# Patient Record
Sex: MALE | Race: OTHER | NOT HISPANIC OR LATINO | URBAN - METROPOLITAN AREA
[De-identification: names, ages, dates, MRNs, and addresses within clinical notes are randomized per-mention and may not be internally consistent; named-entity substitution may affect disease eponyms.]

---

## 2020-11-07 ENCOUNTER — OUTPATIENT (OUTPATIENT)
Dept: OUTPATIENT SERVICES | Facility: HOSPITAL | Age: 75
LOS: 1 days | Discharge: HOME | End: 2020-11-07

## 2020-11-07 ENCOUNTER — APPOINTMENT (OUTPATIENT)
Dept: OTOLARYNGOLOGY | Facility: CLINIC | Age: 75
End: 2020-11-07
Payer: MEDICAID

## 2020-11-07 VITALS
SYSTOLIC BLOOD PRESSURE: 123 MMHG | TEMPERATURE: 98.3 F | BODY MASS INDEX: 24.6 KG/M2 | HEIGHT: 66 IN | HEART RATE: 104 BPM | WEIGHT: 153.05 LBS | DIASTOLIC BLOOD PRESSURE: 68 MMHG

## 2020-11-07 DIAGNOSIS — H90.42 SENSORINEURAL HEARING LOSS, UNILATERAL, LEFT EAR, WITH UNRESTRICTED HEARING ON THE CONTRALATERAL SIDE: ICD-10-CM

## 2020-11-07 DIAGNOSIS — D23.22 OTHER BENIGN NEOPLASM OF SKIN OF LEFT EAR AND EXTERNAL AURICULAR CANAL: ICD-10-CM

## 2020-11-07 DIAGNOSIS — H93.292 OTHER ABNORMAL AUDITORY PERCEPTIONS, LEFT EAR: ICD-10-CM

## 2020-11-07 PROCEDURE — 99204 OFFICE O/P NEW MOD 45 MIN: CPT

## 2020-11-07 PROCEDURE — 99072 ADDL SUPL MATRL&STAF TM PHE: CPT

## 2020-11-07 NOTE — REVIEW OF SYSTEMS
[Hearing Loss] : hearing loss [Dizziness] : dizziness [Vertigo] : vertigo [Cough] : cough [Joint Pain] : joint pain [Negative] : Heme/Lymph

## 2020-11-09 NOTE — HISTORY OF PRESENT ILLNESS
[de-identified] : 75M with left hearing loss beginning in September 2020.  Hearing loss is constant, nonfluctuating, profound severity.  Occasional dizziness.

## 2020-11-09 NOTE — CONSULT LETTER
[FreeTextEntry2] : Juice Pereira MD [FreeTextEntry1] : Dear Juice,\par \par Thanks for referring Ari Edge for evaluation of his left profound hearing loss.  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  I have the report of his MRI and plan to obtain the images for review as well.  Today in the office I discussed the pathophysiology, natural history, and management strategies for acoustic neuroma with him and his family members.  Because of the tumor's small size and his older age, I recommended initial observation and repeat imaging in 4 months.  They are in agreement with this plan.\par \par Thank you once again for the opportunity to participate in your patient's care, and I will keep you informed as to his progress.\par \par Best regards,\par \par Ady Ugarte MD\par Otology/Neurotology\par [facility]\par Health system\par

## 2020-11-09 NOTE — DATA REVIEWED
[de-identified] : I personally reviewed the patient's audiogram, which shows left profound hearing loss.\par  [de-identified] : MRI report shows small 4-5 mm left vestibular schwannoma.\par  [de-identified] : I personally reviewed outside records and they are summarized as follows:  he underwent evaluation by Dr. Pereira and was found to have a left vestibular schwannoma.\par

## 2020-11-09 NOTE — REASON FOR VISIT
[Initial Consultation] : an initial consultation for [Family Member] : family member [FreeTextEntry2] : brain MRI shows lesions on left internal auditory canal. Also patient has left ear hearing loss.

## 2020-12-11 DIAGNOSIS — H93.292 OTHER ABNORMAL AUDITORY PERCEPTIONS, LEFT EAR: ICD-10-CM

## 2020-12-11 DIAGNOSIS — D23.22 OTHER BENIGN NEOPLASM OF SKIN OF LEFT EAR AND EXTERNAL AURICULAR CANAL: ICD-10-CM

## 2020-12-11 DIAGNOSIS — H90.42 SENSORINEURAL HEARING LOSS, UNILATERAL, LEFT EAR, WITH UNRESTRICTED HEARING ON THE CONTRALATERAL SIDE: ICD-10-CM

## 2021-02-06 ENCOUNTER — APPOINTMENT (OUTPATIENT)
Dept: MRI IMAGING | Facility: IMAGING CENTER | Age: 76
End: 2021-02-06

## 2021-07-16 ENCOUNTER — APPOINTMENT (OUTPATIENT)
Dept: CT IMAGING | Facility: CLINIC | Age: 76
End: 2021-07-16

## 2021-08-03 ENCOUNTER — OUTPATIENT (OUTPATIENT)
Dept: OUTPATIENT SERVICES | Facility: HOSPITAL | Age: 76
LOS: 1 days | End: 2021-08-03
Payer: MEDICAID

## 2021-08-03 ENCOUNTER — APPOINTMENT (OUTPATIENT)
Dept: CT IMAGING | Facility: CLINIC | Age: 76
End: 2021-08-03
Payer: MEDICAID

## 2021-08-03 DIAGNOSIS — R06.00 DYSPNEA, UNSPECIFIED: ICD-10-CM

## 2021-08-03 PROCEDURE — 82565 ASSAY OF CREATININE: CPT

## 2021-08-03 PROCEDURE — 75574 CT ANGIO HRT W/3D IMAGE: CPT | Mod: 26

## 2021-08-03 PROCEDURE — 75574 CT ANGIO HRT W/3D IMAGE: CPT

## 2021-12-27 ENCOUNTER — APPOINTMENT (OUTPATIENT)
Dept: DISASTER EMERGENCY | Facility: CLINIC | Age: 76
End: 2021-12-27

## 2021-12-27 DIAGNOSIS — Z01.818 ENCOUNTER FOR OTHER PREPROCEDURAL EXAMINATION: ICD-10-CM

## 2021-12-27 LAB — SARS-COV-2 N GENE NPH QL NAA+PROBE: NOT DETECTED

## 2021-12-28 ENCOUNTER — NON-APPOINTMENT (OUTPATIENT)
Age: 76
End: 2021-12-28

## 2021-12-29 ENCOUNTER — APPOINTMENT (OUTPATIENT)
Dept: PULMONOLOGY | Facility: CLINIC | Age: 76
End: 2021-12-29
Payer: MEDICAID

## 2021-12-29 ENCOUNTER — RX CHANGE (OUTPATIENT)
Age: 76
End: 2021-12-29

## 2021-12-29 VITALS
BODY MASS INDEX: 26.66 KG/M2 | TEMPERATURE: 97.3 F | HEART RATE: 84 BPM | SYSTOLIC BLOOD PRESSURE: 132 MMHG | DIASTOLIC BLOOD PRESSURE: 82 MMHG | HEIGHT: 65 IN | WEIGHT: 160 LBS

## 2021-12-29 DIAGNOSIS — J45.909 UNSPECIFIED ASTHMA, UNCOMPLICATED: ICD-10-CM

## 2021-12-29 PROCEDURE — 99204 OFFICE O/P NEW MOD 45 MIN: CPT | Mod: 25

## 2021-12-29 PROCEDURE — 94010 BREATHING CAPACITY TEST: CPT

## 2021-12-29 PROCEDURE — ZZZZZ: CPT

## 2022-01-01 ENCOUNTER — RX CHANGE (OUTPATIENT)
Age: 77
End: 2022-01-01

## 2022-01-01 LAB
ANA PAT FLD IF-IMP: ABNORMAL
ANA SER IF-ACNC: ABNORMAL
CCP AB SER IA-ACNC: <8 UNITS
CENTROMERE IGG SER-ACNC: <0.2 CD:130001892
CK SERPL-CCNC: 103 U/L
CRP SERPL HS-MCNC: 1.2 MG/L
DEPRECATED KAPPA LC FREE/LAMBDA SER: 1.04 RATIO
DSDNA AB SER-ACNC: 157 IU/ML
ENA JO1 AB SER IA-ACNC: <0.2 AL
ENA RNP AB SER IA-ACNC: <0.2 AL
ENA SCL70 IGG SER IA-ACNC: <0.2 AL
ENA SM AB SER IA-ACNC: <0.2 AL
ENA SS-A AB SER IA-ACNC: <0.2 AL
ENA SS-B AB SER IA-ACNC: <0.2 AL
ERYTHROCYTE [SEDIMENTATION RATE] IN BLOOD BY WESTERGREN METHOD: 62 MM/HR
HIV1+2 AB SPEC QL IA.RAPID: NONREACTIVE
IGA SER QL IEP: 338 MG/DL
IGG SER QL IEP: 1786 MG/DL
IGM SER QL IEP: 65 MG/DL
KAPPA LC CSF-MCNC: 2.6 MG/DL
KAPPA LC SERPL-MCNC: 2.7 MG/DL
M TB IFN-G BLD-IMP: NEGATIVE
QUANTIFERON TB PLUS MITOGEN MINUS NIL: 4.94 IU/ML
QUANTIFERON TB PLUS NIL: 0.03 IU/ML
QUANTIFERON TB PLUS TB1 MINUS NIL: -0.01 IU/ML
QUANTIFERON TB PLUS TB2 MINUS NIL: 0.02 IU/ML
RF+CCP IGG SER-IMP: NEGATIVE
RHEUMATOID FACT SER QL: <10 IU/ML
RIBOSOMAL P AB SER IA-ACNC: <0.2 AL

## 2022-01-01 NOTE — PHYSICAL EXAM
[No Acute Distress] : no acute distress [Normal Appearance] : normal appearance [No Resp Distress] : no resp distress [Benign] : benign [No Edema] : no edema [Normal Rate/Rhythm] : normal rate/rhythm [Normal S1, S2] : normal s1, s2 [TextBox_80] : Limited due to cough, basilar crackles - otherwise clear.  [TextBox_105] : Digital clubbing [TextBox_132] : Gait unsteadiness

## 2022-01-01 NOTE — REVIEW OF SYSTEMS
[Cough] : cough [SOB on Exertion] : sob on exertion [Negative] : Endocrine [TextBox_119] : gait imbalance

## 2022-01-01 NOTE — END OF VISIT
[] : Fellow [Time Spent: ___ minutes] : I have spent [unfilled] minutes of time on the encounter. [>50% of the face to face encounter time was spent on counseling and/or coordination of care for ___] : Greater than 50% of the face to face encounter time was spent on counseling and/or coordination of care for [unfilled] [FreeTextEntry3] : \par Newly diagnosed ILD of unclear etiology with symptoms of dyspnea and non-productive cough, especially with speaking. Recent CT coronaries with bilateral groundglass opacities, septal thickening, and peripheral honeycombing consistent with ILD. He has noticeable clubbing. Has scattered arthralgias and reports dry mouth and dry eyes. He also reports coughing with eating. Spirometry today suggestive of moderate restriction, but FET was only 1.43 seconds, likely underestimating the true FVC.\par \par Differential includes IPF vs. ILD 2/2 CTD vs. chronic aspiration pneumonitis. Labs today with elevated VINCENT and dsDNA along with elevated ESR. Remainder of workup negative, including RF, CCP, CPK, CRP, scleroderma, ribosomal P protein, Rosa-1, centromere, ADRI, Sjogren's, Quantiferon, and HIV. Check dedicated CT chest without contrast. Complete PFTs, including lung volumes and diffusing capacity. Start pantoprazole. Referral to speech and swallow, will likely require FEES. Start trial of prednisone with slow taper. If has clinical improvement, would refer to rheumatology and consider steroid-sparing agent, such as mycophenolate.

## 2022-01-01 NOTE — HISTORY OF PRESENT ILLNESS
[Never] : never [TextBox_4] :  [id number]\par \par 75 yo M PMH L vestibular schwannona/hearing loss and arthritis presenting for follow up of abnormal CT. Had CT coronaries performed on 8/2021 for the evaluation of chest pain and shortness of breath and found to have coronary calcium score of 0 though w/ e/o bilateral ground glass, septal thickening, and peripheral predominant honeycombing. States that since 2018 had symptoms of cough and shortness of breath. Was evaluated by outside pulmonary and treated with inhalers without significant improvement. Symptoms have been progressing throughout the years though with significant improvement since September. Denies prior hospitalizations for breathing. Cough and shortness of breath are daily - nonproductive though with associated chest pain. Unable to state triggers for SOB/cough - though per daughter in law worse w/ speaking. States that he took many medications (unable to name) without improvement. Denies fevers, chills, weight loss, or recent sick contacts. \par \par Denies prior history of lung disease or lung infections. No prior exposure to wood burning ovens. Notes bilateral knee pain, swelling as well as in digits of hands and wrist. Denies rashes. Occasional dry eyes and dry mouth. Has dentures. Also w/ gait unsteadiness. Reports difficulty swallowing/cough with eating. \par \par SHx\par Denies tobacco, alcohol, drug use\par Lives in house with wife, son, daughter in law, and grandchildren\par No pets, no smokers in house\par Currently unemployed since 2018, previously worked in construction\par Emigrated 2010 from [place]\par \par FHx - denies lung disease\par \par Allergies - Denies\par \par Meds: PRN pantoprazole

## 2022-01-01 NOTE — DISCUSSION/SUMMARY
[FreeTextEntry1] : 75 yo M PMH L vestibular schwannona/hearing loss and arthritis presenting for evaluation of abnormal CT suggestive of underlying ILD\par \par - will send off for aldolase, ACE, VINCENT, centromere, CRP, CK, CCP, dsDNA, RF, SS A/B, HIV, immunoglobulin, shelli-1, myomarker, quantiferon, ribosomal P protein, scl, ESR\par - ordered for non con CT of chest\par - repeat PFTs with volumes and spirometry\par - saturations of 94% on ambulation\par - will refer to speech and swallow for aspiration evaluation given cough w/ eating\par - recommend daily pantoprazole\par - will start prednisone 20mg tapered by 5mg every 10 days (15x10, 10x10, 5x10, off)\par - add benzonatate\par \par RTC in 2-3 weeks

## 2022-01-06 LAB
ACE BLD-CCNC: 53 U/L
ALDOLASE SERPL-CCNC: 4.8 U/L

## 2022-01-17 LAB
EJ AB SER QL: NEGATIVE
ENA JO1 AB SER IA-ACNC: <20 UNITS
ENA PM/SCL AB SER-ACNC: <20 UNITS
ENA SM+RNP AB SER IA-ACNC: <20 UNITS
ENA SS-A IGG SER QL: <20 UNITS
FIBRILLARIN AB SER QL: NEGATIVE
KU AB SER QL: NEGATIVE
MDA-5 (P140)(CADM-140): <20 UNITS
MI2 AB SER QL: NEGATIVE
NXP-2 (P140): <20 UNITS
OJ AB SER QL: NEGATIVE
PL12 AB SER QL: NEGATIVE
PL7 AB SER QL: NEGATIVE
SRP AB SERPL QL: NEGATIVE
TIF GAMMA (P155/140): <20 UNITS
U2 SNRNP AB SER QL: NEGATIVE

## 2022-01-21 ENCOUNTER — APPOINTMENT (OUTPATIENT)
Dept: SPEECH THERAPY | Facility: CLINIC | Age: 77
End: 2022-01-21
Payer: MEDICAID

## 2022-01-21 PROCEDURE — 92612 ENDOSCOPY SWALLOW (FEES) VID: CPT | Mod: GN

## 2022-10-05 ENCOUNTER — INPATIENT (INPATIENT)
Facility: HOSPITAL | Age: 77
LOS: 0 days | Discharge: HOME | DRG: 196 | End: 2022-10-06
Attending: STUDENT IN AN ORGANIZED HEALTH CARE EDUCATION/TRAINING PROGRAM | Admitting: STUDENT IN AN ORGANIZED HEALTH CARE EDUCATION/TRAINING PROGRAM
Payer: MEDICAID

## 2022-10-05 VITALS
SYSTOLIC BLOOD PRESSURE: 143 MMHG | HEART RATE: 114 BPM | OXYGEN SATURATION: 90 % | WEIGHT: 160.06 LBS | TEMPERATURE: 101 F | HEIGHT: 67 IN | DIASTOLIC BLOOD PRESSURE: 87 MMHG | RESPIRATION RATE: 20 BRPM

## 2022-10-05 DIAGNOSIS — R91.8 OTHER NONSPECIFIC ABNORMAL FINDING OF LUNG FIELD: ICD-10-CM

## 2022-10-05 DIAGNOSIS — Z29.9 ENCOUNTER FOR PROPHYLACTIC MEASURES, UNSPECIFIED: ICD-10-CM

## 2022-10-05 DIAGNOSIS — R73.9 HYPERGLYCEMIA, UNSPECIFIED: ICD-10-CM

## 2022-10-05 DIAGNOSIS — J18.9 PNEUMONIA, UNSPECIFIED ORGANISM: ICD-10-CM

## 2022-10-05 DIAGNOSIS — R25.1 TREMOR, UNSPECIFIED: ICD-10-CM

## 2022-10-05 LAB
ALBUMIN SERPL ELPH-MCNC: 4.1 G/DL — SIGNIFICANT CHANGE UP (ref 3.3–5)
ALP SERPL-CCNC: 80 U/L — SIGNIFICANT CHANGE UP (ref 40–120)
ALT FLD-CCNC: 9 U/L — LOW (ref 10–45)
ANION GAP SERPL CALC-SCNC: 14 MMOL/L — SIGNIFICANT CHANGE UP (ref 5–17)
APTT BLD: 28.9 SEC — SIGNIFICANT CHANGE UP (ref 27.5–35.5)
AST SERPL-CCNC: 19 U/L — SIGNIFICANT CHANGE UP (ref 10–40)
BASOPHILS # BLD AUTO: 0.04 K/UL — SIGNIFICANT CHANGE UP (ref 0–0.2)
BASOPHILS NFR BLD AUTO: 0.4 % — SIGNIFICANT CHANGE UP (ref 0–2)
BILIRUB SERPL-MCNC: 0.8 MG/DL — SIGNIFICANT CHANGE UP (ref 0.2–1.2)
BUN SERPL-MCNC: 16 MG/DL — SIGNIFICANT CHANGE UP (ref 7–23)
CALCIUM SERPL-MCNC: 9.2 MG/DL — SIGNIFICANT CHANGE UP (ref 8.4–10.5)
CHLORIDE SERPL-SCNC: 98 MMOL/L — SIGNIFICANT CHANGE UP (ref 96–108)
CO2 SERPL-SCNC: 21 MMOL/L — LOW (ref 22–31)
CREAT SERPL-MCNC: 1.08 MG/DL — SIGNIFICANT CHANGE UP (ref 0.5–1.3)
D DIMER BLD IA.RAPID-MCNC: 182 NG/ML DDU — SIGNIFICANT CHANGE UP
EGFR: 72 ML/MIN/1.73M2 — SIGNIFICANT CHANGE UP
EOSINOPHIL # BLD AUTO: 0.19 K/UL — SIGNIFICANT CHANGE UP (ref 0–0.5)
EOSINOPHIL NFR BLD AUTO: 1.9 % — SIGNIFICANT CHANGE UP (ref 0–6)
GAS PNL BLDV: SIGNIFICANT CHANGE UP
GLUCOSE SERPL-MCNC: 156 MG/DL — HIGH (ref 70–99)
HCT VFR BLD CALC: 45 % — SIGNIFICANT CHANGE UP (ref 39–50)
HGB BLD-MCNC: 14.5 G/DL — SIGNIFICANT CHANGE UP (ref 13–17)
IMM GRANULOCYTES NFR BLD AUTO: 0.2 % — SIGNIFICANT CHANGE UP (ref 0–0.9)
INR BLD: 1.17 RATIO — HIGH (ref 0.88–1.16)
LYMPHOCYTES # BLD AUTO: 3.27 K/UL — SIGNIFICANT CHANGE UP (ref 1–3.3)
LYMPHOCYTES # BLD AUTO: 33.5 % — SIGNIFICANT CHANGE UP (ref 13–44)
MCHC RBC-ENTMCNC: 28.8 PG — SIGNIFICANT CHANGE UP (ref 27–34)
MCHC RBC-ENTMCNC: 32.2 GM/DL — SIGNIFICANT CHANGE UP (ref 32–36)
MCV RBC AUTO: 89.5 FL — SIGNIFICANT CHANGE UP (ref 80–100)
MONOCYTES # BLD AUTO: 0.76 K/UL — SIGNIFICANT CHANGE UP (ref 0–0.9)
MONOCYTES NFR BLD AUTO: 7.8 % — SIGNIFICANT CHANGE UP (ref 2–14)
NEUTROPHILS # BLD AUTO: 5.47 K/UL — SIGNIFICANT CHANGE UP (ref 1.8–7.4)
NEUTROPHILS NFR BLD AUTO: 56.2 % — SIGNIFICANT CHANGE UP (ref 43–77)
NRBC # BLD: 0 /100 WBCS — SIGNIFICANT CHANGE UP (ref 0–0)
PLATELET # BLD AUTO: 206 K/UL — SIGNIFICANT CHANGE UP (ref 150–400)
POTASSIUM SERPL-MCNC: 3.8 MMOL/L — SIGNIFICANT CHANGE UP (ref 3.5–5.3)
POTASSIUM SERPL-SCNC: 3.8 MMOL/L — SIGNIFICANT CHANGE UP (ref 3.5–5.3)
PROT SERPL-MCNC: 8.2 G/DL — SIGNIFICANT CHANGE UP (ref 6–8.3)
PROTHROM AB SERPL-ACNC: 13.6 SEC — HIGH (ref 10.5–13.4)
RBC # BLD: 5.03 M/UL — SIGNIFICANT CHANGE UP (ref 4.2–5.8)
RBC # FLD: 14.2 % — SIGNIFICANT CHANGE UP (ref 10.3–14.5)
SODIUM SERPL-SCNC: 133 MMOL/L — LOW (ref 135–145)
WBC # BLD: 9.75 K/UL — SIGNIFICANT CHANGE UP (ref 3.8–10.5)
WBC # FLD AUTO: 9.75 K/UL — SIGNIFICANT CHANGE UP (ref 3.8–10.5)

## 2022-10-05 PROCEDURE — 99285 EMERGENCY DEPT VISIT HI MDM: CPT

## 2022-10-05 PROCEDURE — 93010 ELECTROCARDIOGRAM REPORT: CPT

## 2022-10-05 PROCEDURE — 71045 X-RAY EXAM CHEST 1 VIEW: CPT | Mod: 26

## 2022-10-05 PROCEDURE — 99223 1ST HOSP IP/OBS HIGH 75: CPT

## 2022-10-05 RX ORDER — AZITHROMYCIN 500 MG/1
500 TABLET, FILM COATED ORAL EVERY 24 HOURS
Refills: 0 | Status: DISCONTINUED | OUTPATIENT
Start: 2022-10-06 | End: 2022-10-06

## 2022-10-05 RX ORDER — SODIUM CHLORIDE 9 MG/ML
1000 INJECTION, SOLUTION INTRAVENOUS
Refills: 0 | Status: DISCONTINUED | OUTPATIENT
Start: 2022-10-05 | End: 2022-10-06

## 2022-10-05 RX ORDER — AZITHROMYCIN 500 MG/1
500 TABLET, FILM COATED ORAL ONCE
Refills: 0 | Status: COMPLETED | OUTPATIENT
Start: 2022-10-05 | End: 2022-10-05

## 2022-10-05 RX ORDER — CEFTRIAXONE 500 MG/1
1000 INJECTION, POWDER, FOR SOLUTION INTRAMUSCULAR; INTRAVENOUS ONCE
Refills: 0 | Status: COMPLETED | OUTPATIENT
Start: 2022-10-05 | End: 2022-10-05

## 2022-10-05 RX ORDER — DEXAMETHASONE 0.5 MG/5ML
6 ELIXIR ORAL ONCE
Refills: 0 | Status: COMPLETED | OUTPATIENT
Start: 2022-10-05 | End: 2022-10-05

## 2022-10-05 RX ORDER — DEXTROSE 50 % IN WATER 50 %
25 SYRINGE (ML) INTRAVENOUS ONCE
Refills: 0 | Status: DISCONTINUED | OUTPATIENT
Start: 2022-10-05 | End: 2022-10-06

## 2022-10-05 RX ORDER — DEXTROSE 50 % IN WATER 50 %
12.5 SYRINGE (ML) INTRAVENOUS ONCE
Refills: 0 | Status: DISCONTINUED | OUTPATIENT
Start: 2022-10-05 | End: 2022-10-06

## 2022-10-05 RX ORDER — GLUCAGON INJECTION, SOLUTION 0.5 MG/.1ML
1 INJECTION, SOLUTION SUBCUTANEOUS ONCE
Refills: 0 | Status: DISCONTINUED | OUTPATIENT
Start: 2022-10-05 | End: 2022-10-06

## 2022-10-05 RX ORDER — CEFTRIAXONE 500 MG/1
1000 INJECTION, POWDER, FOR SOLUTION INTRAMUSCULAR; INTRAVENOUS EVERY 24 HOURS
Refills: 0 | Status: DISCONTINUED | OUTPATIENT
Start: 2022-10-06 | End: 2022-10-06

## 2022-10-05 RX ORDER — ACETAMINOPHEN 500 MG
975 TABLET ORAL ONCE
Refills: 0 | Status: COMPLETED | OUTPATIENT
Start: 2022-10-05 | End: 2022-10-05

## 2022-10-05 RX ORDER — ACETAMINOPHEN 500 MG
650 TABLET ORAL EVERY 6 HOURS
Refills: 0 | Status: DISCONTINUED | OUTPATIENT
Start: 2022-10-05 | End: 2022-10-06

## 2022-10-05 RX ORDER — DEXTROSE 50 % IN WATER 50 %
15 SYRINGE (ML) INTRAVENOUS ONCE
Refills: 0 | Status: DISCONTINUED | OUTPATIENT
Start: 2022-10-05 | End: 2022-10-06

## 2022-10-05 RX ORDER — INSULIN LISPRO 100/ML
VIAL (ML) SUBCUTANEOUS AT BEDTIME
Refills: 0 | Status: DISCONTINUED | OUTPATIENT
Start: 2022-10-06 | End: 2022-10-06

## 2022-10-05 RX ORDER — ALBUTEROL 90 UG/1
2 AEROSOL, METERED ORAL EVERY 6 HOURS
Refills: 0 | Status: DISCONTINUED | OUTPATIENT
Start: 2022-10-05 | End: 2022-10-06

## 2022-10-05 RX ORDER — INSULIN LISPRO 100/ML
VIAL (ML) SUBCUTANEOUS
Refills: 0 | Status: DISCONTINUED | OUTPATIENT
Start: 2022-10-05 | End: 2022-10-06

## 2022-10-05 RX ADMIN — AZITHROMYCIN 255 MILLIGRAM(S): 500 TABLET, FILM COATED ORAL at 20:05

## 2022-10-05 RX ADMIN — Medication 975 MILLIGRAM(S): at 22:23

## 2022-10-05 RX ADMIN — ALBUTEROL 2 PUFF(S): 90 AEROSOL, METERED ORAL at 23:48

## 2022-10-05 RX ADMIN — CEFTRIAXONE 100 MILLIGRAM(S): 500 INJECTION, POWDER, FOR SOLUTION INTRAMUSCULAR; INTRAVENOUS at 19:32

## 2022-10-05 RX ADMIN — Medication 6 MILLIGRAM(S): at 19:27

## 2022-10-05 RX ADMIN — Medication 975 MILLIGRAM(S): at 19:32

## 2022-10-05 NOTE — ED PROVIDER NOTE - NS ED ROS FT
Constitutional: + fevers no chills.   CV: no chest pain, no palpitations   Respiratory: + shortness of breath, + cough   GI: no abdominal pain, no nausea no vomiting   Neuro: no headache, no weakness, no numbness

## 2022-10-05 NOTE — ED ADULT NURSE NOTE - PRO INTERPRETER NEED 2
Medication:   Requested Prescriptions     Pending Prescriptions Disp Refills    buPROPion (WELLBUTRIN XL) 150 MG extended release tablet [Pharmacy Med Name: buPROPion HCL  MG TABLET] 30 tablet      Sig: TAKE ONE TABLET BY MOUTH EVERY MORNING     Last Filled:  06/16/21    Last appt: 1/14/2022   Next appt: 4/5/2022    Last OARRS:   RX Monitoring 1/14/2022   Attestation -   Periodic Controlled Substance Monitoring Possible medication side effects, risk of tolerance/dependence & alternative treatments discussed. ;No signs of potential drug abuse or diversion identified. ;Assessed functional status.
Yifan

## 2022-10-05 NOTE — ED PROVIDER NOTE - PHYSICAL EXAMINATION
Const: appearing stated age, no acute distress  Head: atraumatic, normocephalic  Eyes: no conjunctival injection and no scleral icterus  ENMT: Atraumatic external nose and ears, Moist mucus membranes  Neck: Symmetric, trachea midline, no c spine tenderness  BACK: no bruising,   CVS: +S1/S2, dorsalis pedis/radial pulse 2+ bilaterally  RESP: bibasilar crackles, pt w/ tachypnea   GI: Nontender/Nondistended, soft abdomen  MSK: Extremities w/o deformity or ttp   Neuro: GCS=15,motor in all 4 extremities equal, Sensation grossly intact   Psych: Awake, Alert, & Orientedx3;  Appropriate mood and affect, cooperative

## 2022-10-05 NOTE — H&P ADULT - NSHPADDITIONALINFOADULT_GEN_ALL_CORE
NIGHT HOSPITALIST:     Patient/ son in attendance aware of course and agree with  plan/care as above.  Given patient's comorbidities, patient's long term prognosis is guarded.   Emotional support provided to patient/son.   Care reviewed with covering NP/PA for endorsement to my physician colleagues in the AM.    Jorge A Field MD  Available on [employer] Teams. NIGHT HOSPITALIST:     Patient/ son in attendance aware of course and agree with  plan/care as above.  Given patient's comorbidities, patient's long term prognosis is guarded.   Emotional support provided to patient/son.   Care reviewed with covering NP/PAMaria Teresa for endorsement to my physician colleagues in the AM.    Jorge A Field MD  Available on [employer] Teams.

## 2022-10-05 NOTE — ED ADULT NURSE NOTE - NSIMPLEMENTINTERV_GEN_ALL_ED
Implemented All Fall Risk Interventions:  [place] to call system. Call bell, personal items and telephone within reach. Instruct patient to call for assistance. Room bathroom lighting operational. Non-slip footwear when patient is off stretcher. Physically safe environment: no spills, clutter or unnecessary equipment. Stretcher in lowest position, wheels locked, appropriate side rails in place. Provide visual cue, wrist band, yellow gown, etc. Monitor gait and stability. Monitor for mental status changes and reorient to person, place, and time. Review medications for side effects contributing to fall risk. Reinforce activity limits and safety measures with patient and family.

## 2022-10-05 NOTE — H&P ADULT - REASON FOR ADMISSION
Dyspnoea for the past 6 months, worsening past 2 weeks, with intermittent haemoptysis. Dyspnoea for the past 6 months, worsening past 2 weeks, with intermittent hemoptysis.

## 2022-10-05 NOTE — ED PROVIDER NOTE - CLINICAL SUMMARY MEDICAL DECISION MAKING FREE TEXT BOX
77 M w/ hx of dementia, lives at home w/ son born in [place] here w/ cough for 10 days w/ worsening SOB, and fevers. pt w/ bibasilar crackles at the bases hypoxic, no features to suggest DVT on exam, w/ no lower leg edema, plan for labs imaging and admission will cover for pna, vs steroids low risk for PE< will send ddimer if greater than 500, will obtain PE scan.

## 2022-10-05 NOTE — H&P ADULT - HISTORY OF PRESENT ILLNESS
NIGHT HOSPITALIST:    Patient UNKNOWN to me previously, assigned to me at this point via the ER to admit this 78 y/o M--[facility]  # [id number] utilized--bilingual adult son in attendance--patient with apparent irregular followup with a physician (unclear to name) locally at [place], [place]--son reports patient is on no medications other than Robitussin--patient is from [place] but has been stateside for 12 years with no travel since (last in the [place] prior), reports COVID-19 vaccinated x 3, with no tobacco primary or secondary history, with son referring patient following apparently symptoms of dyspnoea for the past 4-6 months, with worsening symptoms for the past 2 weeks, with intermittent scant haemoptysis but no clots.  NO fever, no chills, no rigors.   Patient notes anorexia for several days but unclear to weight loss.   No reported night sweats.  No chest pain/pressure.  NO palpitations.   NO abdominal pain, no red blood per rectum or melena.  NO diaphoresis. NIGHT HOSPITALIST:    Patient UNKNOWN to me previously, assigned to me at this point via the ER to admit this 76 y/o M--[facility]  # [id number] utilized--bilingual adult son in attendance--patient with apparent irregular followup with a physician (unclear to name) locally at [place], [place]--son reports patient is on no medications other than Robitussin--patient is from [place] but has been stateside for 12 years with no travel since (last in the [place] prior), reports COVID-19 vaccinated x 3, with no tobacco primary or secondary history, with son referring patient following apparently symptoms of dyspnoea for the past 4-6 months, with worsening symptoms for the past 2 weeks, with intermittent scant haemoptysis but no clots.  NO fever, no chills, no rigors.   Patient notes anorexia for several days but unclear to weight loss.   No known pigeon interaction or exposure.   No reported night sweats.  No chest pain/pressure.  NO palpitations.   NO abdominal pain, no red blood per rectum or melena.  NO diaphoresis.

## 2022-10-05 NOTE — ED ADULT NURSE NOTE - OBJECTIVE STATEMENT
76 yo male with a PMH of interstitial lung disease, asthma, arthralgia presents to the ED with son at bedside translating complaining of cough. Son states he has been coughing for the past couple of months worsening over the past couple of weeks. Son reports that over the past couple of weeks patient was telling him that patient has been coughing up blood. Has seen PCP initially and had a CXR that was negative for any abnormal findings. Son is bringing patient in now due to hemoptysis. Patient denies any international travel, night sweats. Reports unintentional weight loss. Endorsing L sided non-radiating chest pain. Patient is tachycardic, tachypneic--more exacerbated with movement. Placed on 2L NC for comfort. Denies taking any antipyretics PTA. Denies headache, dizziness, vision changes,  abdominal pain, nausea, vomiting, diarrhea, chills, dysuria, hematuria, recent illness travel or fall.

## 2022-10-05 NOTE — H&P ADULT - MENTAL STATUS
AXOx3.   Speech fluent in native language.   Limited cognitive assessment with patient reliant upon son in attendance for interview, limited assessment with [facility] .

## 2022-10-05 NOTE — ED PROVIDER NOTE - OBJECTIVE STATEMENT
77 M w/ hx of dementia, lives at home w/ son born in [place] unknonw if vaccinated for TB or had TB in childhood here w/ cough for 10 days w/ worsening SOB, and fevers, pt follows w/ Dr. Patten son at bedside translating. pt has been dx w/ multiple condictions but refuses to take home medications. Pt here w/ hemoptysis for 5 days reports that it is intermittent.

## 2022-10-05 NOTE — H&P ADULT - NSHPREVIEWOFSYSTEMS_GEN_ALL_CORE
NO HA< no focal weakness, notes 3 years of unsteadiness.   Pill rolling tremor.  NO chest pain/pressure.  No palpitations.  NO abdominal pain, no red blood per rectum or melena.  No back pain, no tearing back pain.  NO rash.    Patient notes chronic diffuse joint pains.  Anorexia.  Unclear to weight loss.  NO SI/HI>  No thyroid symptoms.  NO dysuria, no hematuria.

## 2022-10-05 NOTE — H&P ADULT - ASSESSMENT
NIGHT HOSPITALIST:    NIGHT HOSPITALIST:    Apparent protracted symptoms (several months) of dyspnoea with worsening last 2 weeks with intermittent hemoptysis, poor PO, with radiographic reticulonodular infiltrate , diffuse digital clubbing, catabolic state--unclear to the rationale for IV steroids given in the ER>>COVID-19 PCR sent.  Will continue with IV antibiotics for now for presumed pneumonia but will maintain on airborne precautions for now.   Sputum for AFB x 3.  Proventil MDI for now and on O2.  CTT chest no contrast ordered.    Would consider formal pulmonary evaluation in the AM.    Patient with nonfocal neurologic exam except for B/L pill rolling tremor (Parkinson's?)>>will obtain a CTT head no contrast.    Will check HgBA1C with hyperglycemia on presentation.    MICHAEL for now with patient's intermittent hemoptysis.

## 2022-10-05 NOTE — H&P ADULT - PROBLEM SELECTOR PLAN 1
See above with hemoptysis.   IV antibiotics as above for now.   CTT chest no contrast ordered.  Would consider formal pulmonary evaluation in the AM.   Will HOLD off on additional steroids for now.   AFB sputum x 3.

## 2022-10-05 NOTE — H&P ADULT - NSHPLABSRESULTS_GEN_ALL_CORE
EKG tracing personally interpreted by me with sinus 88 with 1AV, poor anterior R wave progression.    Chest radiograph personally interpreted by me with bilateral reticulonodular opacities.    WBC 9.7  56N    Hgb 14.5    Platelets of 206K    INR 1.1  D dimer 182    Alb 4.1    Random glucose of 156  Cr 1.0    COVID-19 PCR>>sent.

## 2022-10-06 ENCOUNTER — TRANSCRIPTION ENCOUNTER (OUTPATIENT)
Age: 77
End: 2022-10-06

## 2022-10-06 VITALS — RESPIRATION RATE: 20 BRPM | OXYGEN SATURATION: 94 %

## 2022-10-06 DIAGNOSIS — J96.01 ACUTE RESPIRATORY FAILURE WITH HYPOXIA: ICD-10-CM

## 2022-10-06 DIAGNOSIS — B34.1 ENTEROVIRUS INFECTION, UNSPECIFIED: ICD-10-CM

## 2022-10-06 DIAGNOSIS — J84.9 INTERSTITIAL PULMONARY DISEASE, UNSPECIFIED: ICD-10-CM

## 2022-10-06 DIAGNOSIS — R04.2 HEMOPTYSIS: ICD-10-CM

## 2022-10-06 LAB
A1C WITH ESTIMATED AVERAGE GLUCOSE RESULT: 6.3 % — HIGH (ref 4–5.6)
APPEARANCE UR: CLEAR — SIGNIFICANT CHANGE UP
BILIRUB UR-MCNC: NEGATIVE — SIGNIFICANT CHANGE UP
COLOR SPEC: SIGNIFICANT CHANGE UP
CULTURE RESULTS: NO GROWTH — SIGNIFICANT CHANGE UP
DIFF PNL FLD: NEGATIVE — SIGNIFICANT CHANGE UP
ESTIMATED AVERAGE GLUCOSE: 134 MG/DL — HIGH (ref 68–114)
GLUCOSE BLDC GLUCOMTR-MCNC: 116 MG/DL — HIGH (ref 70–99)
GLUCOSE BLDC GLUCOMTR-MCNC: 141 MG/DL — HIGH (ref 70–99)
GLUCOSE UR QL: NEGATIVE — SIGNIFICANT CHANGE UP
HCV AB S/CO SERPL IA: 0.15 S/CO — SIGNIFICANT CHANGE UP (ref 0–0.99)
HCV AB SERPL-IMP: SIGNIFICANT CHANGE UP
KETONES UR-MCNC: NEGATIVE — SIGNIFICANT CHANGE UP
LEUKOCYTE ESTERASE UR-ACNC: NEGATIVE — SIGNIFICANT CHANGE UP
NITRITE UR-MCNC: NEGATIVE — SIGNIFICANT CHANGE UP
PH UR: 5.5 — SIGNIFICANT CHANGE UP (ref 5–8)
PROT UR-MCNC: NEGATIVE — SIGNIFICANT CHANGE UP
RAPID RVP RESULT: DETECTED
RV+EV RNA SPEC QL NAA+PROBE: DETECTED
SARS-COV-2 RNA SPEC QL NAA+PROBE: SIGNIFICANT CHANGE UP
SP GR SPEC: 1.01 — LOW (ref 1.01–1.02)
SPECIMEN SOURCE: SIGNIFICANT CHANGE UP
UROBILINOGEN FLD QL: NEGATIVE — SIGNIFICANT CHANGE UP

## 2022-10-06 PROCEDURE — 85730 THROMBOPLASTIN TIME PARTIAL: CPT

## 2022-10-06 PROCEDURE — 82947 ASSAY GLUCOSE BLOOD QUANT: CPT

## 2022-10-06 PROCEDURE — 82962 GLUCOSE BLOOD TEST: CPT

## 2022-10-06 PROCEDURE — 85379 FIBRIN DEGRADATION QUANT: CPT

## 2022-10-06 PROCEDURE — 71250 CT THORAX DX C-: CPT

## 2022-10-06 PROCEDURE — 96375 TX/PRO/DX INJ NEW DRUG ADDON: CPT

## 2022-10-06 PROCEDURE — 82330 ASSAY OF CALCIUM: CPT

## 2022-10-06 PROCEDURE — 84132 ASSAY OF SERUM POTASSIUM: CPT

## 2022-10-06 PROCEDURE — 85025 COMPLETE CBC W/AUTO DIFF WBC: CPT

## 2022-10-06 PROCEDURE — 82803 BLOOD GASES ANY COMBINATION: CPT

## 2022-10-06 PROCEDURE — 83036 HEMOGLOBIN GLYCOSYLATED A1C: CPT

## 2022-10-06 PROCEDURE — 99239 HOSP IP/OBS DSCHRG MGMT >30: CPT

## 2022-10-06 PROCEDURE — 70450 CT HEAD/BRAIN W/O DYE: CPT | Mod: 26

## 2022-10-06 PROCEDURE — 85610 PROTHROMBIN TIME: CPT

## 2022-10-06 PROCEDURE — 71045 X-RAY EXAM CHEST 1 VIEW: CPT

## 2022-10-06 PROCEDURE — 87086 URINE CULTURE/COLONY COUNT: CPT

## 2022-10-06 PROCEDURE — 83605 ASSAY OF LACTIC ACID: CPT

## 2022-10-06 PROCEDURE — 70450 CT HEAD/BRAIN W/O DYE: CPT

## 2022-10-06 PROCEDURE — 81003 URINALYSIS AUTO W/O SCOPE: CPT

## 2022-10-06 PROCEDURE — 84295 ASSAY OF SERUM SODIUM: CPT

## 2022-10-06 PROCEDURE — 82435 ASSAY OF BLOOD CHLORIDE: CPT

## 2022-10-06 PROCEDURE — 80053 COMPREHEN METABOLIC PANEL: CPT

## 2022-10-06 PROCEDURE — 85018 HEMOGLOBIN: CPT

## 2022-10-06 PROCEDURE — 86803 HEPATITIS C AB TEST: CPT

## 2022-10-06 PROCEDURE — 87040 BLOOD CULTURE FOR BACTERIA: CPT

## 2022-10-06 PROCEDURE — 99285 EMERGENCY DEPT VISIT HI MDM: CPT | Mod: 25

## 2022-10-06 PROCEDURE — 0225U NFCT DS DNA&RNA 21 SARSCOV2: CPT

## 2022-10-06 PROCEDURE — 85014 HEMATOCRIT: CPT

## 2022-10-06 PROCEDURE — 71250 CT THORAX DX C-: CPT | Mod: 26

## 2022-10-06 PROCEDURE — 96374 THER/PROPH/DIAG INJ IV PUSH: CPT

## 2022-10-06 PROCEDURE — 94640 AIRWAY INHALATION TREATMENT: CPT

## 2022-10-06 PROCEDURE — 99222 1ST HOSP IP/OBS MODERATE 55: CPT | Mod: GC

## 2022-10-06 RX ORDER — ALBUTEROL 90 UG/1
2 AEROSOL, METERED ORAL
Qty: 2 | Refills: 0
Start: 2022-10-06 | End: 2022-11-04

## 2022-10-06 RX ADMIN — ALBUTEROL 2 PUFF(S): 90 AEROSOL, METERED ORAL at 12:46

## 2022-10-06 RX ADMIN — Medication 20 MILLIGRAM(S): at 16:19

## 2022-10-06 RX ADMIN — ALBUTEROL 2 PUFF(S): 90 AEROSOL, METERED ORAL at 06:25

## 2022-10-06 NOTE — PROGRESS NOTE ADULT - PROBLEM SELECTOR PLAN 4
a1c 6.3  -ctm Seen initially on CT coronaries in the past. Has outpatient pulmonologist. Not currently on medications. Current presentation c/f ILD exacerbation vs progression  -Pulm consulted for need for steroid, appreciate recs->20mg pred daily for 7 days  -VINCENT + 1:640 homogenous 12/29/2021, 1:80 2018  -dsDNA +, scleroderma centromere RNP Sm Sjogrens and shelli antibodies negative  -quant neg 12/2021

## 2022-10-06 NOTE — DISCHARGE NOTE PROVIDER - NSDCFUSCHEDAPPT_GEN_ALL_CORE_FT
Jhonny Harris  [facility]  OTOLARYNG  [facility]  Scheduled Appointment: 11/14/2022

## 2022-10-06 NOTE — PROGRESS NOTE ADULT - PROBLEM SELECTOR PLAN 1
Rhino/entero +. likely etiology of worsening of respiratory status.  -supportive care  -NC weaned to 1L, wean to RA  -hold abx Initially tachypnic and hypoxic requiring 2L NC. ISO viral URI on known ILD  -O2, wean as tolerated  -ILD tx as below  -Entero/rhinovirus tx as bllow

## 2022-10-06 NOTE — DISCHARGE NOTE PROVIDER - CARE PROVIDER_API CALL
Jose Rodriguez)  Critical Care Medicine; Internal Medicine; Pulmonary Disease  [address] [place] [address]  Phone: [phone]  Fax: [phone]  Follow Up Time: 2 weeks

## 2022-10-06 NOTE — PROGRESS NOTE ADULT - ASSESSMENT
76 y/o M with h/o ILD presenting with subacute on chronic progressive dyspnea on exertion and 1 week of small volume hemoptysis, found to acute hypoxemic respiratory failure  iso ILD flare iso URI.

## 2022-10-06 NOTE — PROGRESS NOTE ADULT - PROBLEM SELECTOR PLAN 3
Seen initially on CT coronaries in the past. Has outpatient pulmonologist. Not currently on medications. Current presentation c/f ILD exacerbation vs progression  -Pulm consulted for need for steroid, appreciate recs  -VINCENT + 1:640 homogenous 12/29/2021, 1:80 2018  -dsDNA +, scleroderma centromere RNP Sm Sjogrens and shelli antibodies negative  -quant neg 12/2021 Likely iso repetitive tusses. No further episodes. Low concern for Tb based on CT scan and known ILD.  -Monitor for further episodes  -tessalon pearls

## 2022-10-06 NOTE — DISCHARGE NOTE NURSING/CASE MANAGEMENT/SOCIAL WORK - PATIENT PORTAL LINK FT
You can access the FollowMyHealth Patient Portal offered by [facility] by registering at the following website: [email or web address]. By joining [facility] FollowMyHealth portal, you will also be able to view your health information using other applications (apps) compatible with our system.

## 2022-10-06 NOTE — CONSULT NOTE ADULT - ATTENDING COMMENTS
Patient seen and examined. Patient is a 77M with interstitial lung disease who presents with cough, shortness of breath, and blood streaked sputum. He has a CT chest which demonstrates chronic fibrosing ILD and is found to have a positive rhinovirus/enterovirus.    He has not had any further hemoptysis in the ED.    He was seen once previously in outpatient pulmonary evaluation and found to have a positive VINCENT, dsDNA, and a negative quantiferon gold. He has no reported history of tuberculosis, latent tuberculosis, or treatment for either.    1. Hemoptysis - presently resolved  - likely in setting of viral URI/pneumonia - supportive care  - steroids for potential ILD exacerbation    2. 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 Niranjan Proctor - [phone]  - Would give a short course of Prednisone 20mg PO daily - and monitor for response  - Will need followup outpatient testing including PFTs and 6MWT  - If not a candidate for immunosuppressive therapy then may benefit from antifibrotic therapy - this should be discussed as an outpatient. Patient was supposed to come for pulmonary followup but reportedly felt well after prior Prednisone and did not think he required further treatment per family    3. R/O TB - patient with a negative quant gold in 12/2021  - CT findings not consistent with acute pulmonary TB - there appear to be chronic interstitial changes similar to those noted on prior CT coronaries  - Lower suspicion for TB - and patient without fevers, nightsweats, or weight loss. Hemoptysis appears related to coughing in setting of ILD and viral URI    4. Acute Hypoxemic Respiratory Failure - please check ambulatory O2 saturation to determine home O2 requirements.    Discussed with patient and son at bedside. [place]  deferred. Patient's daughter-in-law also called by Dr. Mina earlier in the day to review case/findings/recommendations. All questions answered.

## 2022-10-06 NOTE — CONSULT NOTE ADULT - ASSESSMENT
77M with PMHx of HTN, HLD and lung fibrosis with poor pulmonary follow up, presents to the hospital with 1 week of fever and three days of hemoptysis, admitted for infectious work up.      77M with PMHx of HTN, HLD and lung fibrosis with poor pulmonary follow up, presents to the hospital with 1 week of fever and three days of hemoptysis, admitted for infectious work up.    #Cough and hemoptysis:  - likely in the setting of viral bronchitis secondary to entero/rhinovirus in the background of intersitial lung disease  - ILD pattern consistent with probably UIP pattern  - outpatient work up shows + VINCENT 1:1280 and +dsDNA increasing concern for CTD related interstitial lung disease  - patient with poor pulmonary follow up (last seen in Dec 2021) and no rheum follow up as of yet    Recommendations:  - low suspicion for bacterial PNA, can hold off on abx  - consider 20mg of prednisone for 7 days to treat for viral bronchitis  - patient saturating 93-94% on room air; please obtain ambulatory o2 to determine need for home oxygen  - can send off auto immune serologies: VINCENT, dsDNA, RF, CCP, ESR and CRP which can be followed up as an outpatient    Will contact pulmonary office for outpatient follow up. If ambulatory saturation > 88%, can be discharged home with prednisone 77M with PMHx of HTN, HLD and lung fibrosis with poor pulmonary follow up, presents to the hospital with 1 week of fever and three days of hemoptysis, admitted for infectious work up.    #Cough and hemoptysis:  - likely in the setting of viral bronchitis secondary to entero/rhinovirus in the background of intersitial lung disease  - ILD pattern consistent with probably UIP pattern  - outpatient work up shows + VINCENT 1:1280 and +dsDNA increasing concern for CTD related interstitial lung disease  - patient with poor pulmonary follow up (last seen in Dec 2021) and no rheum follow up as of yet    Recommendations:  - low suspicion for bacterial PNA, can hold off on abx  - consider 20mg of prednisone for 7 days to treat for viral bronchitis  - patient saturating 93-94% on room air; please obtain ambulatory o2 to determine need for home oxygen  - can send off auto immune serologies: VINCENT, dsDNA, RF, CCP, ESR and CRP which can be followed up as an outpatient  - low suspicion for TB as quant gold neg in Dec 2021 and parenchymal findings on CT not suggestive of TB    Will contact pulmonary office for outpatient follow up. If ambulatory saturation > 88%, can be discharged home with prednisone

## 2022-10-06 NOTE — DISCHARGE NOTE PROVIDER - HOSPITAL COURSE
78 y/o M with h/o ILD lost to follow up presenting with worsening BOWIE for 3-4 months with abrupt worsening over the past week with few episodes of hemoptysis  . Found to be entero/rhinovirus +. CT chest with signs repeat signs of ILD. Started on 2L NC. Pulmonary evaluated, most c/w ILD flare iso  URI. Low concern TB. Started on prednisone 20mg for 7 day course. O2 weaned to___________. Ambulatory O2 sat _______. Stable for d/c home    Exam  CONSTITUTIONAL: NAD, well-developed  EYES: PERRLA; conjunctiva and sclera clear  NECK: Supple, no palpable masses  RESPIRATORY: Normal respiratory effort; fine crackles at the bases  CARDIOVASCULAR: Regular rate and rhythm, normal S1 and S2, no murmur/rub/gallop; No lower extremity edema; Peripheral pulses are 2+ bilaterally  ABDOMEN: Nontender to palpation, normoactive bowel sounds  MUSCULOSKELETAL: no clubbing or cyanosis of digits; no joint swelling or tenderness to palpation  NEUROLOGY: CN 2-12 are intact and symmetric; no gross sensory deficits   SKIN: No rashes; no palpable lesions    Labs  entero/rhinovirus +  -VINCENT + 1:640 homogenous 12/29/2021, 1:80 2018  -dsDNA +, scleroderma centromere RNP Sm Sjogrens and shelli antibodies negative  -quant neg 12/2021.    Plan  f/u with pulmonary, to reach out to patient  f/u with rheum for repeat labs and eval (likely element of CTD)  Prednisone 20mg for 7 days 76 y/o M with h/o ILD lost to follow up presenting with worsening BOWIE for 3-4 months with abrupt worsening over the past week with few episodes of hemoptysis  . Found to be entero/rhinovirus +. CT chest with signs repeat signs of ILD. Started on 2L NC. Pulmonary evaluated, most c/w ILD flare iso  URI. Low concern TB. Started on prednisone 20mg for 7 day course. O2 was weaned off. Ambulatory O2 sat is 94% on room air. Stable for d/c home    Exam  CONSTITUTIONAL: NAD, well-developed  EYES: PERRLA; conjunctiva and sclera clear  NECK: Supple, no palpable masses  RESPIRATORY: Normal respiratory effort; fine crackles at the bases  CARDIOVASCULAR: Regular rate and rhythm, normal S1 and S2, no murmur/rub/gallop; No lower extremity edema; Peripheral pulses are 2+ bilaterally  ABDOMEN: Nontender to palpation, normoactive bowel sounds  MUSCULOSKELETAL: no clubbing or cyanosis of digits; no joint swelling or tenderness to palpation  NEUROLOGY: CN 2-12 are intact and symmetric; no gross sensory deficits   SKIN: No rashes; no palpable lesions    Labs  entero/rhinovirus +  -VINCENT + 1:640 homogenous 12/29/2021, 1:80 2018  -dsDNA +, scleroderma centromere RNP Sm Sjogrens and shelli antibodies negative  -quant neg 12/2021.    Plan  f/u with pulmonary, to reach out to patient  f/u with rheum for repeat labs and eval (likely element of CTD)  Prednisone 20mg for 7 days

## 2022-10-06 NOTE — PROGRESS NOTE ADULT - PROBLEM SELECTOR PLAN 2
Likely iso repetitive tusses. No further episodes. Low concern for Tb based on CT scan and known ILD.  -Monitor for further episodes  -tessalon pearls Rhino/entero +. likely etiology of worsening of respiratory status.  -supportive care  -NC weaned to 1L, wean to RA  -hold abx

## 2022-10-06 NOTE — PROGRESS NOTE ADULT - SUBJECTIVE AND OBJECTIVE BOX
[facility] Division of Hospital Medicine  Marco Antonio Verdugo  Pager (M-F, 8A-5P): [phone]  Other Times:  [phone]    Translation services offered, preferred translation via son bedside    SUBJECTIVE / OVERNIGHT EVENTS:  No events this am  Reports cough unchanged  Denies f/chills  No hemoptysis this am  +enterovirus/rhinovirus    ADDITIONAL REVIEW OF SYSTEMS:    MEDICATIONS  (STANDING):  ALBUTerol    90 MICROgram(s) HFA Inhaler 2 Puff(s) Inhalation every 6 hours  dextrose 5%. 1000 milliLiter(s) (50 mL/Hr) IV Continuous <Continuous>  dextrose 5%. 1000 milliLiter(s) (100 mL/Hr) IV Continuous <Continuous>  dextrose 50% Injectable 25 Gram(s) IV Push once  dextrose 50% Injectable 12.5 Gram(s) IV Push once  dextrose 50% Injectable 25 Gram(s) IV Push once  glucagon  Injectable 1 milliGRAM(s) IntraMuscular once  insulin lispro (ADMELOG) corrective regimen sliding scale   SubCutaneous three times a day before meals  insulin lispro (ADMELOG) corrective regimen sliding scale   SubCutaneous at bedtime    MEDICATIONS  (PRN):  acetaminophen    Suspension .. 650 milliGRAM(s) Oral every 6 hours PRN Temp greater or equal to 38C (100.4F), Mild Pain (1 - 3)  dextrose Oral Gel 15 Gram(s) Oral once PRN Blood Glucose LESS THAN 70 milliGRAM(s)/deciliter      I&O's Summary      PHYSICAL EXAM:  Vital Signs Last 24 Hrs  T(C): 36.7 (06 Oct 2022 11:05), Max: 38.1 (05 Oct 2022 18:07)  T(F): 98.1 (06 Oct 2022 11:05), Max: 100.6 (05 Oct 2022 18:07)  HR: 57 (06 Oct 2022 11:05) (53 - 114)  BP: 157/80 (06 Oct 2022 11:05) (122/79 - 157/80)  BP(mean): --  RR: 20 (06 Oct 2022 11:05) (20 - 27)  SpO2: 98% (06 Oct 2022 11:05) (90% - 99%)    Parameters below as of 06 Oct 2022 11:05  Patient On (Oxygen Delivery Method): nasal cannula  O2 Flow (L/min): 1    CONSTITUTIONAL: NAD, well-developed  EYES: PERRLA; conjunctiva and sclera clear  NECK: Supple, no palpable masses  RESPIRATORY: Normal respiratory effort; crackles at the bases  CARDIOVASCULAR: Regular rate and rhythm, normal S1 and S2, no murmur/rub/gallop; No lower extremity edema; Peripheral pulses are 2+ bilaterally  ABDOMEN: Nontender to palpation, normoactive bowel sounds  MUSCULOSKELETAL: no clubbing or cyanosis of digits; no joint swelling or tenderness to palpation  NEUROLOGY: CN 2-12 are intact and symmetric; no gross sensory deficits   SKIN: No rashes; no palpable lesions    LABS:                        14.5   9.75  )-----------( 206      ( 05 Oct 2022 19:12 )             45.0     10-05    133<L>  |  98  |  16  ----------------------------<  156<H>  3.8   |  21<L>  |  1.08    Ca    9.2      05 Oct 2022 19:12    TPro  8.2  /  Alb  4.1  /  TBili  0.8  /  DBili  x   /  AST  19  /  ALT  9<L>  /  AlkPhos  80  10-05    PT/INR - ( 05 Oct 2022 19:12 )   PT: 13.6 sec;   INR: 1.17 ratio         PTT - ( 05 Oct 2022 19:12 )  PTT:28.9 sec      Urinalysis Basic - ( 05 Oct 2022 23:40 )    Color: Light Yellow / Appearance: Clear / S.008 / pH: x  Gluc: x / Ketone: Negative  / Bili: Negative / Urobili: Negative   Blood: x / Protein: Negative / Nitrite: Negative   Leuk Esterase: Negative / RBC: x / WBC x   Sq Epi: x / Non Sq Epi: x / Bacteria: x

## 2022-10-06 NOTE — ED ADULT NURSE REASSESSMENT NOTE - NS ED NURSE REASSESS COMMENT FT1
Pt reporting abd pain 3/10, pRN medication given, sputum culture collected, family and pt updated on plan of care

## 2022-10-06 NOTE — DISCHARGE NOTE PROVIDER - NSDCMRMEDTOKEN_GEN_ALL_CORE_FT
Robitussin 100 mg/5 mL oral liquid:    albuterol 90 mcg/inh inhalation aerosol: 2 puff(s) inhaled every 6 hours  benzonatate 100 mg oral capsule: 1 cap(s) orally every 8 hours, As needed, Cough  predniSONE 20 mg oral tablet: 1 tab(s) orally once a day x 7 days

## 2022-10-06 NOTE — DISCHARGE NOTE PROVIDER - NSFOLLOWUPCLINICS_GEN_ALL_ED_FT
[facility] Rheumatology  Rheumatology  [address] [address]  Phone: [phone]  Fax:   Follow Up Time: 2 weeks

## 2022-10-06 NOTE — CONSULT NOTE ADULT - SUBJECTIVE AND OBJECTIVE BOX
77M with PMHx of HTN, HLD and lung fibrosis with poor pulmonary follow up, presents to the hospital with 1 week of fever and three days of hemoptysis. Patient reports ongoing cough for the past 4 years, and followed up with a pulmonologist last year without further follow up. He reports he is afraid he has TB because he recalls his friend in the 1970s in [place] with similar complains who diagnosed with TB.    He reports no known sick contacts at home (lives with son, daughter in law and three children). He denies any recent hospitalizations as well. No prior history of TB or known TB exposures. He emigrated from [place] in  and has not been out of the country in the last 2 years.     Patient followed with Dr. Cleveland in Dec of 2021, and was asked to follow up but has not. Patient does not use oxygen at home.    PMHx:  Hemoptysis      No significant past surgical history          FAMILY HISTORY:  No pertinent family history in first degree relatives        SOCIAL HISTORY:  Smoking: [ X] Never Smoked [ ] Former Smoker (__ packs x ___ years) [ ] Current Smoker  (__ packs x ___ years)  Substance Use: [ ] Never Used [ ] Used ____  EtOH Use:  Marital Status: [ ] Single [ ]  [ ]  [ ]   Sexual History:   Occupation:  Recent Travel:  Country of Birth:  Advance Directives:    Allergies    No Known Allergies    Intolerances        HOME MEDICATIONS:    REVIEW OF SYSTEMS:  Constitutional: [ ] negative [X ] fevers [ ] chills [ ] weight loss [ ] weight gain  HEENT: [ ] negative [ ] dry eyes [ ] eye irritation [ ] postnasal drip [ ] nasal congestion  CV: [X ] negative  [ ] chest pain [ ] orthopnea [ ] palpitations [ ] murmur  Resp: [ ] negative [ ] cough [ X] shortness of breath [ ] dyspnea [ ] wheezing [ ] sputum [ ] hemoptysis  GI: [ X] negative [ ] nausea [ ] vomiting [ ] diarrhea [ ] constipation [ ] abd pain [ ] dysphagia   : [X ] negative [ ] dysuria [ ] nocturia [ ] hematuria [ ] increased urinary frequency  Musculoskeletal: [ ] negative [ ] back pain [ ] myalgias [ ] arthralgias [ ] fracture  Skin: [ ] negative [ ] rash [ ] itch  Neurological: [ ] negative [ ] headache [ ] dizziness [ ] syncope [ ] weakness [ ] numbness  Psychiatric: [ ] negative [ ] anxiety [ ] depression  Endocrine: [ ] negative [ ] diabetes [ ] thyroid problem  Hematologic/Lymphatic: [ ] negative [ ] anemia [ ] bleeding problem  Allergic/Immunologic: [ ] negative [ ] itchy eyes [ ] nasal discharge [ ] hives [ ] angioedema  [ ] All other systems negative  [ ] Unable to assess ROS because ________    OBJECTIVE:  ICU Vital Signs Last 24 Hrs  T(C): 36.7 (06 Oct 2022 11:05), Max: 38.1 (05 Oct 2022 18:07)  T(F): 98.1 (06 Oct 2022 11:05), Max: 100.6 (05 Oct 2022 18:07)  HR: 57 (06 Oct 2022 11:05) (53 - 114)  BP: 157/80 (06 Oct 2022 11:05) (122/79 - 157/80)  BP(mean): --  ABP: --  ABP(mean): --  RR: 20 (06 Oct 2022 11:05) (20 - 27)  SpO2: 98% (06 Oct 2022 11:05) (90% - 99%)    O2 Parameters below as of 06 Oct 2022 11:05  Patient On (Oxygen Delivery Method): nasal cannula  O2 Flow (L/min): 1            CAPILLARY BLOOD GLUCOSE      POCT Blood Glucose.: 141 mg/dL (06 Oct 2022 08:49)      PHYSICAL EXAM:  General: well appearing, NAD, laying in bed, on NC  HEENT: no icterus  Neck: supple  Respiratory: bibasilar crackles  Cardiovascular: S1/s2, RRR  Abdomen:  soft, nontender, nondistended  Extremities: no LE edema; + clubbing on fingers and toes  Skin: no rashes  Neurological: AxOX3  Psychiatry: normal mood and affect    HOSPITAL MEDICATIONS:        dextrose 50% Injectable 25 Gram(s) IV Push once  dextrose 50% Injectable 12.5 Gram(s) IV Push once  dextrose 50% Injectable 25 Gram(s) IV Push once  dextrose Oral Gel 15 Gram(s) Oral once PRN  glucagon  Injectable 1 milliGRAM(s) IntraMuscular once  insulin lispro (ADMELOG) corrective regimen sliding scale   SubCutaneous three times a day before meals  insulin lispro (ADMELOG) corrective regimen sliding scale   SubCutaneous at bedtime    ALBUTerol    90 MICROgram(s) HFA Inhaler 2 Puff(s) Inhalation every 6 hours    acetaminophen    Suspension .. 650 milliGRAM(s) Oral every 6 hours PRN          dextrose 5%. 1000 milliLiter(s) IV Continuous <Continuous>  dextrose 5%. 1000 milliLiter(s) IV Continuous <Continuous>            LABS:                        14.5   9.75  )-----------( 206      ( 05 Oct 2022 19:12 )             45.0     Hgb Trend: 14.5<--  10-05    133<L>  |  98  |  16  ----------------------------<  156<H>  3.8   |  21<L>  |  1.08    Ca    9.2      05 Oct 2022 19:12    TPro  8.2  /  Alb  4.1  /  TBili  0.8  /  DBili  x   /  AST  19  /  ALT  9<L>  /  AlkPhos  80  10-05    Creatinine Trend: 1.08<--  PT/INR - ( 05 Oct 2022 19:12 )   PT: 13.6 sec;   INR: 1.17 ratio         PTT - ( 05 Oct 2022 19:12 )  PTT:28.9 sec  Urinalysis Basic - ( 05 Oct 2022 23:40 )    Color: Light Yellow / Appearance: Clear / S.008 / pH: x  Gluc: x / Ketone: Negative  / Bili: Negative / Urobili: Negative   Blood: x / Protein: Negative / Nitrite: Negative   Leuk Esterase: Negative / RBC: x / WBC x   Sq Epi: x / Non Sq Epi: x / Bacteria: x        Venous Blood Gas:  10-05 @ 18:39  7.35/54/32/30/54.7  VBG Lactate: 1.7      MICROBIOLOGY:     RADIOLOGY:  [ ] Reviewed and interpreted by me    PULMONARY FUNCTION TESTS:    EKG: 77M with PMHx of HTN, HLD and lung fibrosis with poor pulmonary follow up, presents to the hospital with 1 week of fever and three days of hemoptysis. Patient reports ongoing cough for the past 4 years, and followed up with a pulmonologist last year without further follow up. He reports he is afraid he has TB because he recalls his friend in the 1970s in [place] with similar complains who diagnosed with TB.    He reports no known sick contacts at home (lives with son, daughter in law and three children). He denies any recent hospitalizations as well. No prior history of TB or known TB exposures. He emigrated from [place] in  and has not been out of the country in the last 2 years.     Patient followed with Dr. Cleveland in Dec of 2021, and was asked to follow up but has not. Patient does not use oxygen at home.    PMHx:  Hemoptysis      No significant past surgical history          FAMILY HISTORY:  No pertinent family history in first degree relatives        SOCIAL HISTORY:  Smoking: [ X] Never Smoked [ ] Former Smoker (__ packs x ___ years) [ ] Current Smoker  (__ packs x ___ years)  Substance Use: [ ] Never Used [ ] Used ____  EtOH Use:  Marital Status: [ ] Single [ ]  [ ]  [ ]   Sexual History:   Occupation:  Recent Travel:  Country of Birth:  Advance Directives:    Allergies    No Known Allergies    Intolerances        HOME MEDICATIONS:    REVIEW OF SYSTEMS:  Constitutional: [ ] negative [X ] fevers [ ] chills [ ] weight loss [ ] weight gain  HEENT: [ ] negative [ ] dry eyes [ ] eye irritation [ ] postnasal drip [ ] nasal congestion  CV: [X ] negative  [ ] chest pain [ ] orthopnea [ ] palpitations [ ] murmur  Resp: [ ] negative [ ] cough [ X] shortness of breath [ ] dyspnea [ ] wheezing [ ] sputum [ ] hemoptysis  GI: [ X] negative [ ] nausea [ ] vomiting [ ] diarrhea [ ] constipation [ ] abd pain [ ] dysphagia   : [X ] negative [ ] dysuria [ ] nocturia [ ] hematuria [ ] increased urinary frequency  Musculoskeletal: [ ] negative [ ] back pain [ ] myalgias [ ] arthralgias [ ] fracture  Skin: [ ] negative [ ] rash [ ] itch  Neurological: [ ] negative [ ] headache [ ] dizziness [ ] syncope [ ] weakness [ ] numbness  Psychiatric: [ ] negative [ ] anxiety [ ] depression  Endocrine: [ ] negative [ ] diabetes [ ] thyroid problem  Hematologic/Lymphatic: [ ] negative [ ] anemia [ ] bleeding problem  Allergic/Immunologic: [ ] negative [ ] itchy eyes [ ] nasal discharge [ ] hives [ ] angioedema  [ ] All other systems negative  [ ] Unable to assess ROS because ________    OBJECTIVE:  ICU Vital Signs Last 24 Hrs  T(C): 36.7 (06 Oct 2022 11:05), Max: 38.1 (05 Oct 2022 18:07)  T(F): 98.1 (06 Oct 2022 11:05), Max: 100.6 (05 Oct 2022 18:07)  HR: 57 (06 Oct 2022 11:05) (53 - 114)  BP: 157/80 (06 Oct 2022 11:05) (122/79 - 157/80)  BP(mean): --  ABP: --  ABP(mean): --  RR: 20 (06 Oct 2022 11:05) (20 - 27)  SpO2: 98% (06 Oct 2022 11:05) (90% - 99%)    O2 Parameters below as of 06 Oct 2022 11:05  Patient On (Oxygen Delivery Method): nasal cannula  O2 Flow (L/min): 1            CAPILLARY BLOOD GLUCOSE      POCT Blood Glucose.: 141 mg/dL (06 Oct 2022 08:49)      PHYSICAL EXAM:  General: well appearing, NAD, laying in bed, on NC  HEENT: no icterus  Neck: supple  Respiratory: bibasilar crackles  Cardiovascular: S1/s2, RRR  Abdomen:  soft, nontender, nondistended  Extremities: no LE edema; + clubbing on fingers and toes  Skin: no rashes  Neurological: AxOX3  Psychiatry: normal mood and affect    HOSPITAL MEDICATIONS:        dextrose 50% Injectable 25 Gram(s) IV Push once  dextrose 50% Injectable 12.5 Gram(s) IV Push once  dextrose 50% Injectable 25 Gram(s) IV Push once  dextrose Oral Gel 15 Gram(s) Oral once PRN  glucagon  Injectable 1 milliGRAM(s) IntraMuscular once  insulin lispro (ADMELOG) corrective regimen sliding scale   SubCutaneous three times a day before meals  insulin lispro (ADMELOG) corrective regimen sliding scale   SubCutaneous at bedtime    ALBUTerol    90 MICROgram(s) HFA Inhaler 2 Puff(s) Inhalation every 6 hours    acetaminophen    Suspension .. 650 milliGRAM(s) Oral every 6 hours PRN          dextrose 5%. 1000 milliLiter(s) IV Continuous <Continuous>  dextrose 5%. 1000 milliLiter(s) IV Continuous <Continuous>            LABS:                        14.5   9.75  )-----------( 206      ( 05 Oct 2022 19:12 )             45.0     Hgb Trend: 14.5<--  10-05    133<L>  |  98  |  16  ----------------------------<  156<H>  3.8   |  21<L>  |  1.08    Ca    9.2      05 Oct 2022 19:12    TPro  8.2  /  Alb  4.1  /  TBili  0.8  /  DBili  x   /  AST  19  /  ALT  9<L>  /  AlkPhos  80  10-05    Creatinine Trend: 1.08<--  PT/INR - ( 05 Oct 2022 19:12 )   PT: 13.6 sec;   INR: 1.17 ratio         PTT - ( 05 Oct 2022 19:12 )  PTT:28.9 sec  Urinalysis Basic - ( 05 Oct 2022 23:40 )    Color: Light Yellow / Appearance: Clear / S.008 / pH: x  Gluc: x / Ketone: Negative  / Bili: Negative / Urobili: Negative   Blood: x / Protein: Negative / Nitrite: Negative   Leuk Esterase: Negative / RBC: x / WBC x   Sq Epi: x / Non Sq Epi: x / Bacteria: x        Venous Blood Gas:  10-05 @ 18:39  7.35/54/32/30/54.7  VBG Lactate: 1.7      MICROBIOLOGY:     RADIOLOGY:  [X ] Reviewed and interpreted by me    PULMONARY FUNCTION TESTS:    EKG: 77M with PMHx of HTN, HLD and lung fibrosis with poor pulmonary follow up, presents to the hospital with 1 week of fever and three days of hemoptysis. Patient reports ongoing cough for the past 4 years, and followed up with a pulmonologist last year without further follow up. He reports he is afraid he has TB because he recalls his friend in the 1970s in [place] with similar complains who diagnosed with TB.    He reports no known sick contacts at home (lives with son, daughter in law and three children). He denies any recent hospitalizations as well. No prior history of TB or known TB exposures. He emigrated from [place] in  and has not been out of the country in the last 2 years.     Patient followed with Dr. Cleveland in Dec of 2021, and was asked to follow up but has not. Patient does not use oxygen at home.    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 ; worked in  in construction   Advance Directives:    Allergies    No Known Allergies    Intolerances        HOME MEDICATIONS:    REVIEW OF SYSTEMS:  Constitutional: [ ] negative [X ] fevers [ ] chills [ ] weight loss [ ] weight gain  HEENT: [ ] negative [ ] dry eyes [ ] eye irritation [ ] postnasal drip [ ] nasal congestion  CV: [X ] negative  [ ] chest pain [ ] orthopnea [ ] palpitations [ ] murmur  Resp: [ ] negative [ ] cough [ X] shortness of breath [ ] dyspnea [ ] wheezing [ ] sputum [ ] hemoptysis  GI: [ X] negative [ ] nausea [ ] vomiting [ ] diarrhea [ ] constipation [ ] abd pain [ ] dysphagia   : [X ] negative [ ] dysuria [ ] nocturia [ ] hematuria [ ] increased urinary frequency  Musculoskeletal: [ ] negative [ ] back pain [ ] myalgias [ ] arthralgias [ ] fracture  Skin: [ X] negative [ ] rash [ ] itch  Neurological: [ ] negative [ ] headache [ ] dizziness [ ] syncope [ ] weakness [ ] numbness  Psychiatric: [ ] negative [ ] anxiety [ ] depression  Endocrine: [ ] negative [ ] diabetes [ ] thyroid problem  Hematologic/Lymphatic: [ ] negative [ ] anemia [ ] bleeding problem  Allergic/Immunologic: [ ] negative [ ] itchy eyes [ ] nasal discharge [ ] hives [ ] angioedema  [ ] All other systems negative  [ ] Unable to assess ROS because ________    OBJECTIVE:  ICU Vital Signs Last 24 Hrs  T(C): 36.7 (06 Oct 2022 11:05), Max: 38.1 (05 Oct 2022 18:07)  T(F): 98.1 (06 Oct 2022 11:05), Max: 100.6 (05 Oct 2022 18:07)  HR: 57 (06 Oct 2022 11:05) (53 - 114)  BP: 157/80 (06 Oct 2022 11:05) (122/79 - 157/80)  BP(mean): --  ABP: --  ABP(mean): --  RR: 20 (06 Oct 2022 11:05) (20 - 27)  SpO2: 98% (06 Oct 2022 11:05) (90% - 99%)    O2 Parameters below as of 06 Oct 2022 11:05  Patient On (Oxygen Delivery Method): nasal cannula  O2 Flow (L/min): 1            CAPILLARY BLOOD GLUCOSE      POCT Blood Glucose.: 141 mg/dL (06 Oct 2022 08:49)      PHYSICAL EXAM:  General: well appearing, NAD, laying in bed, on NC  HEENT: no icterus  Neck: supple  Respiratory: bibasilar crackles  Cardiovascular: S1/s2, RRR  Abdomen:  soft, nontender, nondistended  Extremities: no LE edema; + clubbing on fingers and toes  Skin: no rashes  Neurological: AxOX3  Psychiatry: normal mood and affect    HOSPITAL MEDICATIONS:        dextrose 50% Injectable 25 Gram(s) IV Push once  dextrose 50% Injectable 12.5 Gram(s) IV Push once  dextrose 50% Injectable 25 Gram(s) IV Push once  dextrose Oral Gel 15 Gram(s) Oral once PRN  glucagon  Injectable 1 milliGRAM(s) IntraMuscular once  insulin lispro (ADMELOG) corrective regimen sliding scale   SubCutaneous three times a day before meals  insulin lispro (ADMELOG) corrective regimen sliding scale   SubCutaneous at bedtime    ALBUTerol    90 MICROgram(s) HFA Inhaler 2 Puff(s) Inhalation every 6 hours    acetaminophen    Suspension .. 650 milliGRAM(s) Oral every 6 hours PRN          dextrose 5%. 1000 milliLiter(s) IV Continuous <Continuous>  dextrose 5%. 1000 milliLiter(s) IV Continuous <Continuous>            LABS:                        14.5   9.75  )-----------( 206      ( 05 Oct 2022 19:12 )             45.0     Hgb Trend: 14.5<--  10-05    133<L>  |  98  |  16  ----------------------------<  156<H>  3.8   |  21<L>  |  1.08    Ca    9.2      05 Oct 2022 19:12    TPro  8.2  /  Alb  4.1  /  TBili  0.8  /  DBili  x   /  AST  19  /  ALT  9<L>  /  AlkPhos  80  10    Creatinine Trend: 1.08<--  PT/INR - ( 05 Oct 2022 19:12 )   PT: 13.6 sec;   INR: 1.17 ratio         PTT - ( 05 Oct 2022 19:12 )  PTT:28.9 sec  Urinalysis Basic - ( 05 Oct 2022 23:40 )    Color: Light Yellow / Appearance: Clear / S.008 / pH: x  Gluc: x / Ketone: Negative  / Bili: Negative / Urobili: Negative   Blood: x / Protein: Negative / Nitrite: Negative   Leuk Esterase: Negative / RBC: x / WBC x   Sq Epi: x / Non Sq Epi: x / Bacteria: x        Venous Blood Gas:  10-05 @ 18:39  7.35/54/32/30/54.7  VBG Lactate: 1.7      MICROBIOLOGY:     RADIOLOGY:  [X ] Reviewed and interpreted by me    PULMONARY FUNCTION TESTS:    EKG: 77M with PMHx of HTN, HLD and lung fibrosis with poor pulmonary follow up, presents to the hospital with 1 week of fever and three days of hemoptysis. Patient reports ongoing cough for the past 4 years, and followed up with a pulmonologist last year without further follow up. He reports he is afraid he has TB because he recalls his friend in the 1970s in [place] with similar complains who diagnosed with TB.    He reports no known sick contacts at home (lives with son, daughter in law and three children). He denies any recent hospitalizations as well. No prior history of TB or known TB exposures. He emigrated from [place] in  and has not been out of the country in the last 2 years.     Patient followed with Dr. Cleveland in Dec of 2021, and was asked to follow up but has not. Patient does not use oxygen at home.    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 ; worked in  in construction   Advance Directives: Full code    Allergies    No Known Allergies    Intolerances        HOME MEDICATIONS:    REVIEW OF SYSTEMS:  Constitutional: [ ] negative [X ] fevers [ ] chills [ ] weight loss [ ] weight gain  HEENT: [ ] negative [ ] dry eyes [ ] eye irritation [ ] postnasal drip [ ] nasal congestion  CV: [X ] negative  [ ] chest pain [ ] orthopnea [ ] palpitations [ ] murmur  Resp: [ ] negative [ ] cough [ X] shortness of breath [ ] dyspnea [ ] wheezing [ ] sputum [ ] hemoptysis  GI: [ X] negative [ ] nausea [ ] vomiting [ ] diarrhea [ ] constipation [ ] abd pain [ ] dysphagia   : [X ] negative [ ] dysuria [ ] nocturia [ ] hematuria [ ] increased urinary frequency  Musculoskeletal: [ ] negative [ ] back pain [ ] myalgias [ ] arthralgias [ ] fracture  Skin: [ X] negative [ ] rash [ ] itch  Neurological: [ ] negative [ ] headache [ ] dizziness [ ] syncope [ ] weakness [ ] numbness  Psychiatric: [ ] negative [ ] anxiety [ ] depression  Endocrine: [ ] negative [ ] diabetes [ ] thyroid problem  Hematologic/Lymphatic: [ ] negative [ ] anemia [ ] bleeding problem  Allergic/Immunologic: [ ] negative [ ] itchy eyes [ ] nasal discharge [ ] hives [ ] angioedema  [x ] All other systems negative  [ ] Unable to assess ROS because ________    OBJECTIVE:  ICU Vital Signs Last 24 Hrs  T(C): 36.7 (06 Oct 2022 11:05), Max: 38.1 (05 Oct 2022 18:07)  T(F): 98.1 (06 Oct 2022 11:05), Max: 100.6 (05 Oct 2022 18:07)  HR: 57 (06 Oct 2022 11:05) (53 - 114)  BP: 157/80 (06 Oct 2022 11:05) (122/79 - 157/80)  BP(mean): --  ABP: --  ABP(mean): --  RR: 20 (06 Oct 2022 11:05) (20 - 27)  SpO2: 98% (06 Oct 2022 11:05) (90% - 99%)    O2 Parameters below as of 06 Oct 2022 11:05  Patient On (Oxygen Delivery Method): nasal cannula  O2 Flow (L/min): 1            CAPILLARY BLOOD GLUCOSE      POCT Blood Glucose.: 141 mg/dL (06 Oct 2022 08:49)      PHYSICAL EXAM:  General: well appearing, NAD, laying in bed, on NC  HEENT: no icterus  Neck: supple  Respiratory: bibasilar crackles  Cardiovascular: S1/s2, RRR  Abdomen:  soft, nontender, nondistended  Extremities: no LE edema; + clubbing on fingers and toes  Skin: no rashes  Neurological: AxOX3  Psychiatry: normal mood and affect    HOSPITAL MEDICATIONS:        dextrose 50% Injectable 25 Gram(s) IV Push once  dextrose 50% Injectable 12.5 Gram(s) IV Push once  dextrose 50% Injectable 25 Gram(s) IV Push once  dextrose Oral Gel 15 Gram(s) Oral once PRN  glucagon  Injectable 1 milliGRAM(s) IntraMuscular once  insulin lispro (ADMELOG) corrective regimen sliding scale   SubCutaneous three times a day before meals  insulin lispro (ADMELOG) corrective regimen sliding scale   SubCutaneous at bedtime    ALBUTerol    90 MICROgram(s) HFA Inhaler 2 Puff(s) Inhalation every 6 hours    acetaminophen    Suspension .. 650 milliGRAM(s) Oral every 6 hours PRN          dextrose 5%. 1000 milliLiter(s) IV Continuous <Continuous>  dextrose 5%. 1000 milliLiter(s) IV Continuous <Continuous>            LABS:                        14.5   9.75  )-----------( 206      ( 05 Oct 2022 19:12 )             45.0     Hgb Trend: 14.5<--  10-05    133<L>  |  98  |  16  ----------------------------<  156<H>  3.8   |  21<L>  |  1.08    Ca    9.2      05 Oct 2022 19:12    TPro  8.2  /  Alb  4.1  /  TBili  0.8  /  DBili  x   /  AST  19  /  ALT  9<L>  /  AlkPhos  80  10    Creatinine Trend: 1.08<--  PT/INR - ( 05 Oct 2022 19:12 )   PT: 13.6 sec;   INR: 1.17 ratio         PTT - ( 05 Oct 2022 19:12 )  PTT:28.9 sec  Urinalysis Basic - ( 05 Oct 2022 23:40 )    Color: Light Yellow / Appearance: Clear / S.008 / pH: x  Gluc: x / Ketone: Negative  / Bili: Negative / Urobili: Negative   Blood: x / Protein: Negative / Nitrite: Negative   Leuk Esterase: Negative / RBC: x / WBC x   Sq Epi: x / Non Sq Epi: x / Bacteria: x        Venous Blood Gas:  10-05 @ 18:39  7.35/54/32/30/54.7  VBG Lactate: 1.7      MICROBIOLOGY:     RADIOLOGY:  [X ] Reviewed and interpreted by me    PULMONARY FUNCTION TESTS:    EKG:

## 2022-10-06 NOTE — DISCHARGE NOTE PROVIDER - NSDCCPCAREPLAN_GEN_ALL_CORE_FT
PRINCIPAL DISCHARGE DIAGNOSIS  Diagnosis: Pneumonia  Assessment and Plan of Treatment: resolved, f/up with Pulm      SECONDARY DISCHARGE DIAGNOSES  Diagnosis: ILD (interstitial lung disease)  Assessment and Plan of Treatment: complete prednisone, f/up with pulm     PRINCIPAL DISCHARGE DIAGNOSIS  Diagnosis: Pneumonia  Assessment and Plan of Treatment: resolved, f/up with Pulm      SECONDARY DISCHARGE DIAGNOSES  Diagnosis: Enterovirus infection  Assessment and Plan of Treatment: resolved    Diagnosis: ILD (interstitial lung disease)  Assessment and Plan of Treatment: complete prednisone, f/up with pulm and Rheumatology

## 2022-10-10 PROBLEM — Z87.898 PERSONAL HISTORY OF OTHER SPECIFIED CONDITIONS: Chronic | Status: ACTIVE | Noted: 2022-10-05

## 2022-10-10 PROBLEM — R04.2 HEMOPTYSIS: Chronic | Status: ACTIVE | Noted: 2022-10-05

## 2022-10-10 LAB
CULTURE RESULTS: SIGNIFICANT CHANGE UP
CULTURE RESULTS: SIGNIFICANT CHANGE UP
SPECIMEN SOURCE: SIGNIFICANT CHANGE UP
SPECIMEN SOURCE: SIGNIFICANT CHANGE UP

## 2022-10-19 ENCOUNTER — APPOINTMENT (OUTPATIENT)
Dept: PULMONOLOGY | Facility: CLINIC | Age: 77
End: 2022-10-19

## 2022-11-14 ENCOUNTER — APPOINTMENT (OUTPATIENT)
Dept: OTOLARYNGOLOGY | Facility: CLINIC | Age: 77
End: 2022-11-14

## 2022-11-17 ENCOUNTER — APPOINTMENT (OUTPATIENT)
Dept: CARDIOLOGY | Facility: CLINIC | Age: 77
End: 2022-11-17

## 2022-12-20 ENCOUNTER — APPOINTMENT (OUTPATIENT)
Dept: GASTROENTEROLOGY | Facility: CLINIC | Age: 77
End: 2022-12-20

## 2022-12-22 ENCOUNTER — APPOINTMENT (OUTPATIENT)
Dept: CARDIOLOGY | Facility: CLINIC | Age: 77
End: 2022-12-22

## 2023-11-15 ENCOUNTER — APPOINTMENT (OUTPATIENT)
Dept: PULMONOLOGY | Facility: CLINIC | Age: 78
End: 2023-11-15
Payer: COMMERCIAL

## 2023-11-15 VITALS
WEIGHT: 145 LBS | DIASTOLIC BLOOD PRESSURE: 86 MMHG | OXYGEN SATURATION: 93 % | BODY MASS INDEX: 24.16 KG/M2 | HEIGHT: 65 IN | HEART RATE: 97 BPM | RESPIRATION RATE: 17 BRPM | SYSTOLIC BLOOD PRESSURE: 136 MMHG

## 2023-11-15 PROCEDURE — 99213 OFFICE O/P EST LOW 20 MIN: CPT | Mod: GC,25

## 2023-11-15 PROCEDURE — 36415 COLL VENOUS BLD VENIPUNCTURE: CPT

## 2023-11-16 LAB
ALBUMIN SERPL ELPH-MCNC: 4.3 G/DL
ALP BLD-CCNC: 85 U/L
ALT SERPL-CCNC: 10 U/L
ANION GAP SERPL CALC-SCNC: 10 MMOL/L
AST SERPL-CCNC: 24 U/L
BILIRUB SERPL-MCNC: 0.6 MG/DL
BUN SERPL-MCNC: 14 MG/DL
CALCIUM SERPL-MCNC: 9.3 MG/DL
CHLORIDE SERPL-SCNC: 101 MMOL/L
CO2 SERPL-SCNC: 28 MMOL/L
CREAT SERPL-MCNC: 0.88 MG/DL
EGFR: 89 ML/MIN/1.73M2
GLUCOSE SERPL-MCNC: 105 MG/DL
HCT VFR BLD CALC: 42.6 %
HGB BLD-MCNC: 13.9 G/DL
MCHC RBC-ENTMCNC: 29.6 PG
MCHC RBC-ENTMCNC: 32.6 GM/DL
MCV RBC AUTO: 90.8 FL
PLATELET # BLD AUTO: 236 K/UL
POTASSIUM SERPL-SCNC: 4.6 MMOL/L
PROT SERPL-MCNC: 8.2 G/DL
RBC # BLD: 4.69 M/UL
RBC # FLD: 15.1 %
SODIUM SERPL-SCNC: 140 MMOL/L
WBC # FLD AUTO: 7.47 K/UL

## 2023-11-29 ENCOUNTER — APPOINTMENT (OUTPATIENT)
Dept: PULMONOLOGY | Facility: CLINIC | Age: 78
End: 2023-11-29

## 2023-12-12 RX ORDER — PREDNISONE 10 MG/1
10 TABLET ORAL
Qty: 50 | Refills: 0 | Status: ACTIVE | COMMUNITY
Start: 2023-12-12 | End: 1900-01-01

## 2024-03-06 ENCOUNTER — APPOINTMENT (OUTPATIENT)
Dept: PULMONOLOGY | Facility: CLINIC | Age: 79
End: 2024-03-06
Payer: COMMERCIAL

## 2024-03-06 VITALS
WEIGHT: 142 LBS | TEMPERATURE: 97.3 F | BODY MASS INDEX: 23.95 KG/M2 | RESPIRATION RATE: 17 BRPM | HEIGHT: 64.5 IN | SYSTOLIC BLOOD PRESSURE: 150 MMHG | OXYGEN SATURATION: 87 % | DIASTOLIC BLOOD PRESSURE: 90 MMHG | HEART RATE: 105 BPM

## 2024-03-06 PROCEDURE — 99213 OFFICE O/P EST LOW 20 MIN: CPT | Mod: GC

## 2024-03-06 RX ORDER — PREDNISONE 20 MG/1
20 TABLET ORAL DAILY
Qty: 60 | Refills: 1 | Status: ACTIVE | COMMUNITY
Start: 2024-03-06 | End: 1900-01-01

## 2024-03-06 NOTE — PHYSICAL EXAM
[No Acute Distress] : no acute distress [Normal Appearance] : normal appearance [Normal Rate/Rhythm] : normal rate/rhythm [Normal S1, S2] : normal s1, s2 [No Murmurs] : no murmurs [No Resp Distress] : no resp distress [No Focal Deficits] : no focal deficits [Oriented x3] : oriented x3 [Normal Affect] : normal affect [TextBox_2] : comfortable, occasioanl cough [TextBox_68] : crackles at bilat bases [TextBox_105] : +clubbing, trace edema in LUE

## 2024-03-06 NOTE — ASSESSMENT
[FreeTextEntry1] : 78M with ILD likely 2/2 CTD.  #ILD 2/2 CTD, most likely Lupus #hypoxia #cough  - pt has completed the Prednisone regimen but unfortunately did not receive refill - today, will restart Prednisone but at 20mg, and will continue until seen by rheumatology - stressed importance of seeing a rheumatology; will contact clinic  - will schedule CT chest without contrast and PFTs - desat to 89% on ambulation; will monitor sats at future visits as he may qualify for O2 if he does not respond to medication -RTC 1mo

## 2024-03-06 NOTE — HISTORY OF PRESENT ILLNESS
[Never] : never [TextBox_4] : 79 yo M PMH L vestibular schwannona/hearing loss and arthritis p/f follow up  Pt was last seen in clinic on 11/15/23 for f/up for ILD and worsening cough and dyspnea CT of the coronaries showed bilateral groundglass opacities, septal thickening, peripheral predominant honeycombing.   At last visit, was started on Prednisone 40mg and he completed the taper regimen (Not currently on it). States there was no change or improvement in his cough; it is still present throughout the day and night and results in SOB.  Was also ordered for CT chest and PFTs at last visit, but hasn't completed it yet. Also, has not seen rheumatology yet.  Brief relevant hx Several years of worsening dyspnea and cough. No smoking or lung diseases or known exposures. He did have dry eyes and dry mouth as well as swelling of the knees and fingers. No rashes noted. He noted at that time and continues to note coughing with eating. His family member reports that he was assessed in [place] and was treated with medications although she is not sure what kind only that it did not help. Notable labs demonstrated positive VINCENT 1: 640 and positive dsDNA. He went for swallow evaluation with FEES which was normal. He also had spirometry done which was not a great quality study however it had the suggestion of restriction. Patient was then lost to follow-up for 3 years.

## 2024-03-28 ENCOUNTER — APPOINTMENT (OUTPATIENT)
Dept: CT IMAGING | Facility: IMAGING CENTER | Age: 79
End: 2024-03-28

## 2024-04-06 ENCOUNTER — APPOINTMENT (OUTPATIENT)
Dept: CT IMAGING | Facility: IMAGING CENTER | Age: 79
End: 2024-04-06

## 2024-04-06 ENCOUNTER — OUTPATIENT (OUTPATIENT)
Dept: OUTPATIENT SERVICES | Facility: HOSPITAL | Age: 79
LOS: 1 days | End: 2024-04-06
Payer: MEDICARE

## 2024-04-06 DIAGNOSIS — J84.9 INTERSTITIAL PULMONARY DISEASE, UNSPECIFIED: ICD-10-CM

## 2024-04-06 PROCEDURE — 71250 CT THORAX DX C-: CPT

## 2024-04-06 PROCEDURE — 71250 CT THORAX DX C-: CPT | Mod: 26

## 2024-04-24 ENCOUNTER — APPOINTMENT (OUTPATIENT)
Dept: PULMONOLOGY | Facility: CLINIC | Age: 79
End: 2024-04-24
Payer: COMMERCIAL

## 2024-04-24 VITALS
DIASTOLIC BLOOD PRESSURE: 85 MMHG | HEIGHT: 64 IN | SYSTOLIC BLOOD PRESSURE: 149 MMHG | HEART RATE: 112 BPM | TEMPERATURE: 97 F | BODY MASS INDEX: 23.9 KG/M2 | OXYGEN SATURATION: 90 % | RESPIRATION RATE: 15 BRPM | WEIGHT: 140 LBS

## 2024-04-24 DIAGNOSIS — J84.9 INTERSTITIAL PULMONARY DISEASE, UNSPECIFIED: ICD-10-CM

## 2024-04-24 PROCEDURE — 99213 OFFICE O/P EST LOW 20 MIN: CPT

## 2024-04-24 RX ORDER — PREDNISONE 5 MG/1
5 TABLET ORAL
Qty: 100 | Refills: 0 | Status: DISCONTINUED | COMMUNITY
Start: 2021-12-29 | End: 2024-04-24

## 2024-04-24 RX ORDER — PANTOPRAZOLE 40 MG/1
40 TABLET, DELAYED RELEASE ORAL DAILY
Qty: 1 | Refills: 1 | Status: DISCONTINUED | COMMUNITY
Start: 2021-12-29 | End: 2024-04-24

## 2024-04-24 RX ORDER — PREDNISONE 10 MG/1
10 TABLET ORAL DAILY
Qty: 120 | Refills: 0 | Status: DISCONTINUED | COMMUNITY
Start: 2023-11-15 | End: 2024-04-24

## 2024-04-24 RX ORDER — PREDNISONE 10 MG/1
10 TABLET ORAL
Qty: 60 | Refills: 3 | Status: ACTIVE | COMMUNITY
Start: 2024-04-24 | End: 1900-01-01

## 2024-04-24 RX ORDER — BENZONATATE 200 MG/1
200 CAPSULE ORAL
Qty: 90 | Refills: 1 | Status: DISCONTINUED | COMMUNITY
Start: 2021-12-29 | End: 2024-04-24

## 2024-04-24 NOTE — PHYSICAL EXAM
[No Acute Distress] : no acute distress [Normal Appearance] : normal appearance [Normal Rate/Rhythm] : normal rate/rhythm [Normal S1, S2] : normal s1, s2 [No Abnormalities] : no abnormalities [Benign] : benign [Normal Gait] : normal gait [TextBox_11] : facial rash [TextBox_68] : crackles [TextBox_105] : clubbing

## 2024-04-24 NOTE — ASSESSMENT
[FreeTextEntry1] : 78M +SLE markers with ILD here for follow up  Likely CT-ILD Some response to steroids, will continue 10 mg pred until sees rheum Progression on CT of chronic inflammatory changes with likely some progression of fibrosis as well Unclear how much response to therapy there will be at this time  - pred 10 mg qD - rheum eval May 9 - qualified for home O2 here in the office on ambulation Oxygen saturation on room air at rest 91% Oxygen saturation on exertion on room air 87% Oxygen saturation on 2 L of oxygen per minute at rest 95% Oxygen saturation on exertion on 2 L of oxygen per minute 92% - PFTs ordered for follow up visit - return in 1 month

## 2024-04-24 NOTE — HISTORY OF PRESENT ILLNESS
[TextBox_4] : 78M ILD w/ +SLE markers here for follow up SOB better, cough persists with yellow phlegm - unchanged for years Has not seen rheum yet 87% RA here  No fevers, chills

## 2024-04-24 NOTE — REVIEW OF SYSTEMS
[Cough] : cough [Sputum] : sputum [Fever] : no fever [Chills] : no chills [Chest Discomfort] : no chest discomfort [GERD] : no gerd [Arthralgias] : no arthralgias

## 2024-05-09 ENCOUNTER — APPOINTMENT (OUTPATIENT)
Dept: RHEUMATOLOGY | Facility: CLINIC | Age: 79
End: 2024-05-09

## 2024-05-24 ENCOUNTER — APPOINTMENT (OUTPATIENT)
Dept: PULMONOLOGY | Facility: CLINIC | Age: 79
End: 2024-05-24

## 2024-05-29 ENCOUNTER — EMERGENCY (EMERGENCY)
Facility: HOSPITAL | Age: 79
LOS: 1 days | Discharge: HOME | End: 2024-05-29
Attending: EMERGENCY MEDICINE
Payer: MEDICARE

## 2024-05-29 VITALS
WEIGHT: 139.99 LBS | OXYGEN SATURATION: 94 % | HEIGHT: 66 IN | RESPIRATION RATE: 18 BRPM | TEMPERATURE: 98 F | HEART RATE: 74 BPM | DIASTOLIC BLOOD PRESSURE: 94 MMHG | SYSTOLIC BLOOD PRESSURE: 176 MMHG

## 2024-05-29 VITALS
RESPIRATION RATE: 18 BRPM | OXYGEN SATURATION: 96 % | HEART RATE: 66 BPM | TEMPERATURE: 98 F | SYSTOLIC BLOOD PRESSURE: 171 MMHG | DIASTOLIC BLOOD PRESSURE: 100 MMHG

## 2024-05-29 PROCEDURE — 73030 X-RAY EXAM OF SHOULDER: CPT | Mod: 26,RT

## 2024-05-29 PROCEDURE — 99284 EMERGENCY DEPT VISIT MOD MDM: CPT | Mod: 25

## 2024-05-29 PROCEDURE — 73030 X-RAY EXAM OF SHOULDER: CPT

## 2024-05-29 PROCEDURE — 99284 EMERGENCY DEPT VISIT MOD MDM: CPT

## 2024-05-29 NOTE — ED PROVIDER NOTE - PATIENT PORTAL LINK FT
You can access the FollowMyHealth Patient Portal offered by [facility] by registering at the following website: [email or web address]. By joining [employer]’s FollowMyHealth portal, you will also be able to view your health information using other applications (apps) compatible with our system.

## 2024-05-29 NOTE — ED PROVIDER NOTE - NSFOLLOWUPCLINICS_GEN_ALL_ED_FT
[facility]  Orthopedic Surgery  [address] [address]  Phone: [phone]  Fax:

## 2024-05-29 NOTE — ED PROVIDER NOTE - NSFOLLOWUPINSTRUCTIONS_ED_ALL_ED_FT
Continue current home medications.  Take Tylenol 1000 mg every 6-8 hours as needed for pain.  Rest and ice right shoulder.  Use sling as needed for comfort.  Follow-up with orthopedics within 1 week of discharge.  Someone will call you in the next few days to help you schedule an appointment.  Return to the ER for worsening pain, swelling, fevers or any other concerning symptoms.

## 2024-05-29 NOTE — ED PROVIDER NOTE - CROS ED SKIN ALL NEG
Anesthesia Evaluation     . Pt has had prior anesthetic. Type: General and MAC           ROS/MED HX    ENT/Pulmonary:  - neg pulmonary ROS     Neurologic:  - neg neurologic ROS     Cardiovascular:  - neg cardiovascular ROS       METS/Exercise Tolerance:  >4 METS   Hematologic:  - neg hematologic  ROS       Musculoskeletal:  - neg musculoskeletal ROS       GI/Hepatic:  - neg GI/hepatic ROS       Renal/Genitourinary:         Endo:  - neg endo ROS       Psychiatric:  - neg psychiatric ROS       Infectious Disease:  - neg infectious disease ROS       Malignancy:      - no malignancy   Other:    - neg other ROS                 Physical Exam  Normal systems: cardiovascular, pulmonary and dental    Airway   Mallampati: II  TM distance: <3 FB  Neck ROM: full    Dental     Cardiovascular   Rhythm and rate: regular and normal      Pulmonary    breath sounds clear to auscultation                    Anesthesia Plan      History & Physical Review  History and physical reviewed and following examination; no interval change.    ASA Status:  1 .    NPO Status:  > 8 hours    Plan for MAC with Intravenous and Propofol induction. Maintenance will be Other.  Reason for MAC:  Deep or markedly invasive procedure (G8)  PONV prophylaxis:  Ondansetron (or other 5HT-3)       Postoperative Care  Postoperative pain management:  IV analgesics and Oral pain medications.      Consents  Anesthetic plan, risks, benefits and alternatives discussed with:  Patient..                          .   negative...

## 2024-05-29 NOTE — ED PROVIDER NOTE - CLINICAL SUMMARY MEDICAL DECISION MAKING FREE TEXT BOX
Attending note.  Acute injury of right shoulder with likely rotator cuff tear/supraspinatus.  X-ray of shoulder to rule out avulsions.  Patient declines analgesia.  Referral to sports medicine/orthopedics.

## 2024-05-29 NOTE — ED ADULT NURSE NOTE - OBJECTIVE STATEMENT
79 y/o M no PMHx presents to the ED w/ right shoulder pain x 1 week. Pt's grandson reports patient was picking up a heavy garbage bag when he started feeling pain to his right shoulder. Pt unable to lift his arm without assistance. Pt otherwise denies numbness/tingling, weakness, hx of fractures. Pt is A&Ox4, awake and alert, resting comfortably in stretcher. Pt dressed in gown, stretcher locked in place at lowest position with side rails up.

## 2024-05-29 NOTE — ED ADULT NURSE NOTE - NSICDXNOFAMILYHX_GEN_ALL_CORE
<-- Click to add NO pertinent Family History
pt received to room 1, AaOx3 c/o N/V and lower abdominal pain. pt has had these symptoms for about a week.  pt states that his pain is located in lower right abdomen. pt abdomen is soft and non tender. pt skin fold shows that he may be dehydrated. pt has been around his wife who overcame a stomach virus recently. labs collected and initial assessment completed with Md present in room. awaiting orders

## 2024-05-29 NOTE — ED ADULT NURSE NOTE - NSFALLUNIVINTERV_ED_ALL_ED
Bed/Stretcher in lowest position, wheels locked, appropriate side rails in place/Call bell, personal items and telephone in reach/Instruct patient to call for assistance before getting out of bed/chair/stretcher/Non-slip footwear applied when patient is off stretcher/[place] to call system/Physically safe environment - no spills, clutter or unnecessary equipment/Purposeful proactive rounding/Room/bathroom lighting operational, light cord in reach

## 2024-05-29 NOTE — ED PROVIDER NOTE - OBJECTIVE STATEMENT
Patient was seen in room #31 to the left.  Patient was lifting something overhead when he felt acute pain in his right dominant shoulder.  Patient now complaining of weakness and pain and inability to raise his arm over his head.  He has no numbness or paresthesia.  Denies any neck pain.  His no significant past medical head history and takes no prescription medications.  He has no allergies to medication.

## 2024-05-29 NOTE — ED PROVIDER NOTE - NSDCPRINTRESULTS_ED_ALL_ED
Pt arrives from home with c/o left sided CP, left shoulder and left upper back pain onset \"Friday\".  Pt states it \"started with a crook in my neck\".  Denies cough, fevers and known injury.  Intermittently hyperventilating in triage.  Hx of asthma.  81mg aspirin pta.  Selectively answering questions, \"I'm gonna talk to the doctor in the back\".  Hx of schizophrenia and bipolar disorder.  When asked if there's anything that makes the pain worse pt states, \"answering questions!\".     Patient requests all Lab, Cardiology, and Radiology Results on their Discharge Instructions

## 2024-05-29 NOTE — ED ADULT NURSE NOTE - CHPI ED NUR SEVERITY2
Subjective  Chief Complaint   Patient presents with    Follow-up     Back pain for approx 1 week showed a spot on spine and spleen enlarged , K low palpitations       Other     Requesting referral pysc     HPI:  Lisa Lindquist is a 27 y.o. female, established patient who present for an ED follow up. ED visit date: 8/26    Discharge diagnosis: Abdominal Pain in RLQ, hypokalemia, acute right sided sciatica    Treatment: CT abdomen - no acute process, mild sphlenomegaly. Potasium 2.9. elevated WBC 14.7. Negative U/A. EKG normal.     Specialist follow up appointments: PCP    Since discharge patient denies any fevers, shortness of breath, lower extremity edema, diarrhea, and constipation. Medication lists reconciled, home meds have been resumed with no questions. Back Pain:   Started about a week ago. Initially started in the front and has since radiated to the back. Lidocaine patch not working, naproxen and muscle relaxer are also not helping, stretching has not helped. Patient worried because she states she was told there was a possible abscess on her spine, but when reviewing the CT results, this was not noted. Has not tried any heat   Hx. Anxiety     Hypokalemia:   Was given potassium to take at home, but has not taken due to occasional palpitations since discharge. Depression:  Brother passed away in January and wanting a counselor and psychiatry  Denies SI or HI at this current time. Past Medical History:   Diagnosis Date    Asthma     HTN (hypertension)     Migraine     Pure hypercholesterolemia 05/01/2023     History reviewed. No pertinent surgical history.   Social History     Socioeconomic History    Marital status: Single     Spouse name: Not on file    Number of children: Not on file    Years of education: Not on file    Highest education level: Not on file   Occupational History    Not on file   Tobacco Use    Smoking status: Never    Smokeless tobacco: Never   Vaping Use    Vaping Use: PAIN SCALE 4 OF 10.

## 2024-05-29 NOTE — ED PROVIDER NOTE - PROGRESS NOTE DETAILS
X-ray negative for acute fracture or dislocation.  Concern for rotator cuff injury.  Will place patient in sling and encourage close outpatient orthopedic follow-up. -Shelton Duggan PA-C

## 2024-05-29 NOTE — ED PROVIDER NOTE - PHYSICAL EXAMINATION
Attending note.  Patient is alert in no acute distress.  Examination of the right shoulder reveals some atrophy of the subscapularis there is no other deformity.  There is no obvious dislocation.  There is no tenderness over the SC joint, clavicle or AC joint.  There is no tenderness over the lateral shoulder.  Patient is unable to forward flex or AB duct secondary to pain and weakness in the right shoulder.  Neurovascular examination is normal.  Patient has good biceps and triceps strength which is symmetrical.  There is no numbness or paresthesia in distal extremity.

## 2024-10-09 ENCOUNTER — APPOINTMENT (OUTPATIENT)
Dept: PULMONOLOGY | Facility: CLINIC | Age: 79
End: 2024-10-09
Payer: COMMERCIAL

## 2024-10-09 VITALS
BODY MASS INDEX: 21.85 KG/M2 | SYSTOLIC BLOOD PRESSURE: 142 MMHG | TEMPERATURE: 98 F | RESPIRATION RATE: 16 BRPM | HEART RATE: 94 BPM | HEIGHT: 64 IN | WEIGHT: 128 LBS | OXYGEN SATURATION: 90 % | DIASTOLIC BLOOD PRESSURE: 93 MMHG

## 2024-10-09 DIAGNOSIS — J84.9 INTERSTITIAL PULMONARY DISEASE, UNSPECIFIED: ICD-10-CM

## 2024-10-09 PROCEDURE — 99214 OFFICE O/P EST MOD 30 MIN: CPT

## 2024-10-09 RX ORDER — PREDNISONE 20 MG/1
20 TABLET ORAL DAILY
Qty: 14 | Refills: 0 | Status: ACTIVE | COMMUNITY
Start: 2024-10-09 | End: 1900-01-01

## 2024-10-09 RX ORDER — PREDNISONE 5 MG/1
5 TABLET ORAL
Qty: 180 | Refills: 0 | Status: ACTIVE | COMMUNITY
Start: 2024-10-09 | End: 1900-01-01

## 2024-10-24 ENCOUNTER — APPOINTMENT (OUTPATIENT)
Dept: OPHTHALMOLOGY | Facility: CLINIC | Age: 79
End: 2024-10-24

## 2024-11-01 ENCOUNTER — APPOINTMENT (OUTPATIENT)
Dept: PULMONOLOGY | Facility: CLINIC | Age: 79
End: 2024-11-01
Payer: COMMERCIAL

## 2024-11-01 PROCEDURE — 94010 BREATHING CAPACITY TEST: CPT

## 2024-11-01 PROCEDURE — 94726 PLETHYSMOGRAPHY LUNG VOLUMES: CPT

## 2024-11-13 ENCOUNTER — APPOINTMENT (OUTPATIENT)
Dept: PULMONOLOGY | Facility: CLINIC | Age: 79
End: 2024-11-13
Payer: COMMERCIAL

## 2024-11-13 VITALS
HEART RATE: 104 BPM | WEIGHT: 135 LBS | BODY MASS INDEX: 23.05 KG/M2 | SYSTOLIC BLOOD PRESSURE: 178 MMHG | HEIGHT: 64 IN | DIASTOLIC BLOOD PRESSURE: 89 MMHG | OXYGEN SATURATION: 84 %

## 2024-11-13 DIAGNOSIS — J84.9 INTERSTITIAL PULMONARY DISEASE, UNSPECIFIED: ICD-10-CM

## 2024-11-13 PROCEDURE — 99213 OFFICE O/P EST LOW 20 MIN: CPT | Mod: GC

## 2024-11-13 RX ORDER — PREDNISONE 10 MG/1
10 TABLET ORAL DAILY
Qty: 30 | Refills: 2 | Status: ACTIVE | COMMUNITY
Start: 2024-11-13 | End: 1900-01-01

## 2024-12-09 ENCOUNTER — APPOINTMENT (OUTPATIENT)
Dept: RHEUMATOLOGY | Facility: CLINIC | Age: 79
End: 2024-12-09
Payer: COMMERCIAL

## 2024-12-09 VITALS
HEIGHT: 64 IN | DIASTOLIC BLOOD PRESSURE: 86 MMHG | BODY MASS INDEX: 23.05 KG/M2 | OXYGEN SATURATION: 96 % | HEART RATE: 80 BPM | WEIGHT: 135 LBS | RESPIRATION RATE: 16 BRPM | SYSTOLIC BLOOD PRESSURE: 143 MMHG

## 2024-12-09 DIAGNOSIS — G89.29 PAIN IN RIGHT SHOULDER: ICD-10-CM

## 2024-12-09 DIAGNOSIS — M25.542 PAIN IN JOINTS OF RIGHT HAND: ICD-10-CM

## 2024-12-09 DIAGNOSIS — M25.511 PAIN IN RIGHT SHOULDER: ICD-10-CM

## 2024-12-09 DIAGNOSIS — M25.541 PAIN IN JOINTS OF RIGHT HAND: ICD-10-CM

## 2024-12-09 DIAGNOSIS — M25.512 PAIN IN RIGHT SHOULDER: ICD-10-CM

## 2024-12-09 LAB
ALBUMIN SERPL ELPH-MCNC: 4.2 G/DL
ALP BLD-CCNC: 79 U/L
ALT SERPL-CCNC: 11 U/L
ANION GAP SERPL CALC-SCNC: 11 MMOL/L
APPEARANCE: CLEAR
AST SERPL-CCNC: 20 U/L
BASOPHILS # BLD AUTO: 0.05 K/UL
BASOPHILS NFR BLD AUTO: 0.6 %
BILIRUB SERPL-MCNC: 0.5 MG/DL
BILIRUBIN URINE: NEGATIVE
BLOOD URINE: NEGATIVE
BUN SERPL-MCNC: 19 MG/DL
CALCIUM SERPL-MCNC: 9.8 MG/DL
CCP AB SER IA-ACNC: <8 U/ML
CHLORIDE SERPL-SCNC: 102 MMOL/L
CK SERPL-CCNC: 57 U/L
CO2 SERPL-SCNC: 31 MMOL/L
COLOR: YELLOW
CREAT SERPL-MCNC: 0.84 MG/DL
CRP SERPL-MCNC: 5 MG/L
EGFR: 90 ML/MIN/1.73M2
EOSINOPHIL # BLD AUTO: 0.17 K/UL
EOSINOPHIL NFR BLD AUTO: 2 %
ERYTHROCYTE [SEDIMENTATION RATE] IN BLOOD BY WESTERGREN METHOD: 24 MM/HR
FERRITIN SERPL-MCNC: 192 NG/ML
GLUCOSE QUALITATIVE U: NEGATIVE MG/DL
GLUCOSE SERPL-MCNC: 100 MG/DL
HCT VFR BLD CALC: 45.4 %
HGB BLD-MCNC: 14 G/DL
IMM GRANULOCYTES NFR BLD AUTO: 0.2 %
KETONES URINE: NEGATIVE MG/DL
LEUKOCYTE ESTERASE URINE: NEGATIVE
LYMPHOCYTES # BLD AUTO: 2.27 K/UL
LYMPHOCYTES NFR BLD AUTO: 26.5 %
MAN DIFF?: NORMAL
MCHC RBC-ENTMCNC: 28.4 PG
MCHC RBC-ENTMCNC: 30.8 G/DL
MCV RBC AUTO: 92.1 FL
MONOCYTES # BLD AUTO: 0.56 K/UL
MONOCYTES NFR BLD AUTO: 6.5 %
NEUTROPHILS # BLD AUTO: 5.49 K/UL
NEUTROPHILS NFR BLD AUTO: 64.2 %
NITRITE URINE: NEGATIVE
PH URINE: 5.5
PLATELET # BLD AUTO: 220 K/UL
POTASSIUM SERPL-SCNC: 5.3 MMOL/L
PROT SERPL-MCNC: 7.8 G/DL
PROTEIN URINE: NEGATIVE MG/DL
RBC # BLD: 4.93 M/UL
RBC # FLD: 16.6 %
RF+CCP IGG SER-IMP: NEGATIVE
RHEUMATOID FACT SER QL: 53 IU/ML
SODIUM SERPL-SCNC: 143 MMOL/L
SPECIFIC GRAVITY URINE: 1.02
UROBILINOGEN URINE: 0.2 MG/DL
WBC # FLD AUTO: 8.56 K/UL

## 2024-12-09 PROCEDURE — G2211 COMPLEX E/M VISIT ADD ON: CPT

## 2024-12-09 PROCEDURE — 99204 OFFICE O/P NEW MOD 45 MIN: CPT

## 2024-12-09 RX ORDER — LOSARTAN POTASSIUM 25 MG/1
25 TABLET, FILM COATED ORAL
Qty: 180 | Refills: 3 | Status: ACTIVE | COMMUNITY
Start: 2024-12-09

## 2024-12-09 RX ORDER — DONEPEZIL HYDROCHLORIDE 10 MG/1
10 TABLET ORAL DAILY
Refills: 0 | Status: ACTIVE | COMMUNITY
Start: 2024-12-09

## 2024-12-09 RX ORDER — ATORVASTATIN CALCIUM 20 MG/1
20 TABLET, FILM COATED ORAL
Refills: 0 | Status: ACTIVE | COMMUNITY
Start: 2024-12-09

## 2024-12-09 NOTE — ED PROVIDER NOTE - WR ORDER DATE AND TIME 1
TCM Note:      Reviewed discharge summary/records.     First attempt to contact pt to check on condition since discharge from [facility] on 12/6/24 for dx of nausea and vomiting. Spoke with mother whom states patient is doing much better.     Assessment and support for treatment of regimen adherence and medication management:    The patient's appetite is normal.  Tolerating adequate food and fluids well.    Urinating as usual. BM normal.    Education provided on: medication, follow-up appointments.     Reminded of pending hospital discharge follow up appt with Dr. Castro on 12/17/24 @ 9:20am.  Reminded to arrive 10 min early for appt.  Verbalized understanding.          05-Oct-2022 18:59

## 2024-12-10 LAB
B2 GLYCOPROT1 IGA SERPL IA-ACNC: <2 U/ML
B2 GLYCOPROT1 IGG SER-ACNC: <1.4 U/ML
B2 GLYCOPROT1 IGM SER-ACNC: <1.5 U/ML
C3 SERPL-MCNC: 126 MG/DL
C4 SERPL-MCNC: 30 MG/DL
CARDIOLIPIN IGM SER-MCNC: <1.5 MPL U/ML
CARDIOLIPIN IGM SER-MCNC: <1.6 GPL U/ML
CREAT SPEC-SCNC: 116 MG/DL
CREAT/PROT UR: 0.1 RATIO
DEPRECATED CARDIOLIPIN IGA SER: <2 APL U/ML
DEPRECATED KAPPA LC FREE/LAMBDA SER: 1.22 RATIO
DSDNA AB SER-ACNC: <1 IU/ML
ENA RNP AB SER IA-ACNC: <0.2 AL
ENA SM AB SER IA-ACNC: <0.2 AL
ENA SS-A AB SER IA-ACNC: <0.2 AL
ENA SS-B AB SER IA-ACNC: <0.2 AL
IGA SER QL IEP: 408 MG/DL
IGG SER QL IEP: 1856 MG/DL
IGM SER QL IEP: 57 MG/DL
KAPPA LC CSF-MCNC: 3 MG/DL
KAPPA LC SERPL-MCNC: 3.66 MG/DL
PROT UR-MCNC: 13 MG/DL

## 2024-12-12 ENCOUNTER — APPOINTMENT (OUTPATIENT)
Dept: RADIOLOGY | Facility: IMAGING CENTER | Age: 79
End: 2024-12-12
Payer: MEDICARE

## 2024-12-12 ENCOUNTER — OUTPATIENT (OUTPATIENT)
Dept: OUTPATIENT SERVICES | Facility: HOSPITAL | Age: 79
LOS: 1 days | End: 2024-12-12
Payer: MEDICARE

## 2024-12-12 DIAGNOSIS — M17.0 BILATERAL PRIMARY OSTEOARTHRITIS OF KNEE: ICD-10-CM

## 2024-12-12 DIAGNOSIS — M25.541 PAIN IN JOINTS OF RIGHT HAND: ICD-10-CM

## 2024-12-12 DIAGNOSIS — M25.511 PAIN IN RIGHT SHOULDER: ICD-10-CM

## 2024-12-12 PROCEDURE — 73562 X-RAY EXAM OF KNEE 3: CPT | Mod: 26,50

## 2024-12-12 PROCEDURE — 73130 X-RAY EXAM OF HAND: CPT | Mod: 26,50

## 2024-12-12 PROCEDURE — 73030 X-RAY EXAM OF SHOULDER: CPT | Mod: 26,50

## 2024-12-12 PROCEDURE — 73030 X-RAY EXAM OF SHOULDER: CPT

## 2024-12-12 PROCEDURE — 73562 X-RAY EXAM OF KNEE 3: CPT

## 2024-12-12 PROCEDURE — 73130 X-RAY EXAM OF HAND: CPT

## 2024-12-18 ENCOUNTER — APPOINTMENT (OUTPATIENT)
Dept: PULMONOLOGY | Facility: CLINIC | Age: 79
End: 2024-12-18
Payer: COMMERCIAL

## 2024-12-18 VITALS
DIASTOLIC BLOOD PRESSURE: 71 MMHG | HEIGHT: 64 IN | OXYGEN SATURATION: 91 % | BODY MASS INDEX: 23.56 KG/M2 | RESPIRATION RATE: 16 BRPM | SYSTOLIC BLOOD PRESSURE: 152 MMHG | HEART RATE: 95 BPM | WEIGHT: 138 LBS

## 2024-12-18 DIAGNOSIS — J84.9 INTERSTITIAL PULMONARY DISEASE, UNSPECIFIED: ICD-10-CM

## 2024-12-18 PROCEDURE — 99214 OFFICE O/P EST MOD 30 MIN: CPT | Mod: GC

## 2024-12-19 LAB
CENP-A: <11 SI
CENP-B: <11 SI
FIBRILLARIN: <11 SI
PM/SCL-100: <11 SI
PM/SCL-75: <11 SI
RP11: <11 SI
RP155: <11 SI
SCL-70: <11 SI
TH/TO: <11 SI
U1-SNRNP RNP A: <11 SI
U1-SNRNP RNP C: <11 SI
U1-SNRNP RNP-70KD: <11 SI

## 2025-01-06 ENCOUNTER — RX RENEWAL (OUTPATIENT)
Age: 80
End: 2025-01-06

## 2025-02-24 ENCOUNTER — APPOINTMENT (OUTPATIENT)
Dept: RHEUMATOLOGY | Facility: CLINIC | Age: 80
End: 2025-02-24

## 2025-04-26 ENCOUNTER — INPATIENT (INPATIENT)
Facility: HOSPITAL | Age: 80
LOS: 2 days | Discharge: HOME HEALTH | DRG: 195 | End: 2025-04-29
Attending: STUDENT IN AN ORGANIZED HEALTH CARE EDUCATION/TRAINING PROGRAM | Admitting: STUDENT IN AN ORGANIZED HEALTH CARE EDUCATION/TRAINING PROGRAM
Payer: MEDICAID

## 2025-04-26 VITALS
RESPIRATION RATE: 20 BRPM | WEIGHT: 149.91 LBS | OXYGEN SATURATION: 95 % | SYSTOLIC BLOOD PRESSURE: 135 MMHG | HEIGHT: 66 IN | DIASTOLIC BLOOD PRESSURE: 80 MMHG | HEART RATE: 104 BPM | TEMPERATURE: 97 F

## 2025-04-26 DIAGNOSIS — J18.9 PNEUMONIA, UNSPECIFIED ORGANISM: ICD-10-CM

## 2025-04-26 LAB
ADD ON TEST-SPECIMEN IN LAB: SIGNIFICANT CHANGE UP
ALBUMIN SERPL ELPH-MCNC: 3.8 G/DL — SIGNIFICANT CHANGE UP (ref 3.3–5)
ALP SERPL-CCNC: 74 U/L — SIGNIFICANT CHANGE UP (ref 40–120)
ALT FLD-CCNC: 16 U/L — SIGNIFICANT CHANGE UP (ref 10–45)
ANION GAP SERPL CALC-SCNC: 13 MMOL/L — SIGNIFICANT CHANGE UP (ref 5–17)
APPEARANCE UR: CLEAR — SIGNIFICANT CHANGE UP
APTT BLD: 29.2 SEC — SIGNIFICANT CHANGE UP (ref 26.1–36.8)
AST SERPL-CCNC: 28 U/L — SIGNIFICANT CHANGE UP (ref 10–40)
BASOPHILS # BLD AUTO: 0 K/UL — SIGNIFICANT CHANGE UP (ref 0–0.2)
BASOPHILS NFR BLD AUTO: 0 % — SIGNIFICANT CHANGE UP (ref 0–2)
BILIRUB SERPL-MCNC: 1.1 MG/DL — SIGNIFICANT CHANGE UP (ref 0.2–1.2)
BILIRUB UR-MCNC: NEGATIVE — SIGNIFICANT CHANGE UP
BUN SERPL-MCNC: 20 MG/DL — SIGNIFICANT CHANGE UP (ref 7–23)
CALCIUM SERPL-MCNC: 9.7 MG/DL — SIGNIFICANT CHANGE UP (ref 8.4–10.5)
CHLORIDE SERPL-SCNC: 101 MMOL/L — SIGNIFICANT CHANGE UP (ref 96–108)
CO2 SERPL-SCNC: 26 MMOL/L — SIGNIFICANT CHANGE UP (ref 22–31)
COLOR SPEC: YELLOW — SIGNIFICANT CHANGE UP
CREAT SERPL-MCNC: 0.94 MG/DL — SIGNIFICANT CHANGE UP (ref 0.5–1.3)
DIFF PNL FLD: NEGATIVE — SIGNIFICANT CHANGE UP
EGFR: 83 ML/MIN/1.73M2 — SIGNIFICANT CHANGE UP
EGFR: 83 ML/MIN/1.73M2 — SIGNIFICANT CHANGE UP
EOSINOPHIL # BLD AUTO: 0.29 K/UL — SIGNIFICANT CHANGE UP (ref 0–0.5)
EOSINOPHIL NFR BLD AUTO: 3.4 % — SIGNIFICANT CHANGE UP (ref 0–6)
FLUAV AG NPH QL: SIGNIFICANT CHANGE UP
FLUBV AG NPH QL: SIGNIFICANT CHANGE UP
GAS PNL BLDV: SIGNIFICANT CHANGE UP
GAS PNL BLDV: SIGNIFICANT CHANGE UP
GIANT PLATELETS BLD QL SMEAR: PRESENT — SIGNIFICANT CHANGE UP
GLUCOSE SERPL-MCNC: 118 MG/DL — HIGH (ref 70–99)
GLUCOSE UR QL: 100 MG/DL
HCT VFR BLD CALC: 44.8 % — SIGNIFICANT CHANGE UP (ref 39–50)
HGB BLD-MCNC: 14.2 G/DL — SIGNIFICANT CHANGE UP (ref 13–17)
INR BLD: 1.19 RATIO — HIGH (ref 0.85–1.16)
KETONES UR-MCNC: NEGATIVE MG/DL — SIGNIFICANT CHANGE UP
LEUKOCYTE ESTERASE UR-ACNC: NEGATIVE — SIGNIFICANT CHANGE UP
LYMPHOCYTES # BLD AUTO: 2.4 K/UL — SIGNIFICANT CHANGE UP (ref 1–3.3)
LYMPHOCYTES # BLD AUTO: 27.7 % — SIGNIFICANT CHANGE UP (ref 13–44)
MANUAL SMEAR VERIFICATION: SIGNIFICANT CHANGE UP
MCHC RBC-ENTMCNC: 29.4 PG — SIGNIFICANT CHANGE UP (ref 27–34)
MCHC RBC-ENTMCNC: 31.7 G/DL — LOW (ref 32–36)
MCV RBC AUTO: 92.8 FL — SIGNIFICANT CHANGE UP (ref 80–100)
METAMYELOCYTES # FLD: 2.5 % — HIGH (ref 0–0)
METAMYELOCYTES NFR BLD: 2.5 % — HIGH (ref 0–0)
MONOCYTES # BLD AUTO: 1.02 K/UL — HIGH (ref 0–0.9)
MONOCYTES NFR BLD AUTO: 11.8 % — SIGNIFICANT CHANGE UP (ref 2–14)
NEUTROPHILS # BLD AUTO: 4.73 K/UL — SIGNIFICANT CHANGE UP (ref 1.8–7.4)
NEUTROPHILS NFR BLD AUTO: 37 % — LOW (ref 43–77)
NEUTS BAND # BLD: 17.6 % — HIGH (ref 0–8)
NEUTS BAND NFR BLD: 17.6 % — HIGH (ref 0–8)
NITRITE UR-MCNC: NEGATIVE — SIGNIFICANT CHANGE UP
NT-PROBNP SERPL-SCNC: 190 PG/ML — SIGNIFICANT CHANGE UP (ref 0–300)
PH UR: 5.5 — SIGNIFICANT CHANGE UP (ref 5–8)
PLAT MORPH BLD: NORMAL — SIGNIFICANT CHANGE UP
PLATELET # BLD AUTO: 185 K/UL — SIGNIFICANT CHANGE UP (ref 150–400)
POTASSIUM SERPL-MCNC: 4.1 MMOL/L — SIGNIFICANT CHANGE UP (ref 3.5–5.3)
POTASSIUM SERPL-SCNC: 4.1 MMOL/L — SIGNIFICANT CHANGE UP (ref 3.5–5.3)
PROCALCITONIN SERPL-MCNC: 0.14 NG/ML — HIGH (ref 0.02–0.1)
PROT SERPL-MCNC: 8.3 G/DL — SIGNIFICANT CHANGE UP (ref 6–8.3)
PROT UR-MCNC: SIGNIFICANT CHANGE UP MG/DL
PROTHROM AB SERPL-ACNC: 13.5 SEC — HIGH (ref 9.9–13.4)
RBC # BLD: 4.83 M/UL — SIGNIFICANT CHANGE UP (ref 4.2–5.8)
RBC # FLD: 13.9 % — SIGNIFICANT CHANGE UP (ref 10.3–14.5)
RBC BLD AUTO: SIGNIFICANT CHANGE UP
RSV RNA NPH QL NAA+NON-PROBE: SIGNIFICANT CHANGE UP
SARS-COV-2 RNA SPEC QL NAA+PROBE: SIGNIFICANT CHANGE UP
SODIUM SERPL-SCNC: 140 MMOL/L — SIGNIFICANT CHANGE UP (ref 135–145)
SOURCE RESPIRATORY: SIGNIFICANT CHANGE UP
SP GR SPEC: 1.02 — SIGNIFICANT CHANGE UP (ref 1–1.03)
TROPONIN T, HIGH SENSITIVITY RESULT: 13 NG/L — SIGNIFICANT CHANGE UP (ref 0–51)
TROPONIN T, HIGH SENSITIVITY RESULT: 15 NG/L — SIGNIFICANT CHANGE UP (ref 0–51)
UROBILINOGEN FLD QL: 1 MG/DL — SIGNIFICANT CHANGE UP (ref 0.2–1)
WBC # BLD: 8.67 K/UL — SIGNIFICANT CHANGE UP (ref 3.8–10.5)
WBC # FLD AUTO: 8.67 K/UL — SIGNIFICANT CHANGE UP (ref 3.8–10.5)

## 2025-04-26 PROCEDURE — 71045 X-RAY EXAM CHEST 1 VIEW: CPT | Mod: 26

## 2025-04-26 PROCEDURE — 93010 ELECTROCARDIOGRAM REPORT: CPT

## 2025-04-26 PROCEDURE — 71250 CT THORAX DX C-: CPT | Mod: 26

## 2025-04-26 PROCEDURE — 93308 TTE F-UP OR LMTD: CPT | Mod: 26

## 2025-04-26 PROCEDURE — 99223 1ST HOSP IP/OBS HIGH 75: CPT

## 2025-04-26 PROCEDURE — 99285 EMERGENCY DEPT VISIT HI MDM: CPT

## 2025-04-26 RX ORDER — ACETAMINOPHEN 500 MG/5ML
1000 LIQUID (ML) ORAL ONCE
Refills: 0 | Status: COMPLETED | OUTPATIENT
Start: 2025-04-26 | End: 2025-04-26

## 2025-04-26 RX ORDER — IPRATROPIUM BROMIDE AND ALBUTEROL SULFATE .5; 2.5 MG/3ML; MG/3ML
3 SOLUTION RESPIRATORY (INHALATION) ONCE
Refills: 0 | Status: COMPLETED | OUTPATIENT
Start: 2025-04-26 | End: 2025-04-26

## 2025-04-26 RX ORDER — MELATONIN 5 MG
3 TABLET ORAL AT BEDTIME
Refills: 0 | Status: DISCONTINUED | OUTPATIENT
Start: 2025-04-26 | End: 2025-04-29

## 2025-04-26 RX ORDER — IPRATROPIUM BROMIDE AND ALBUTEROL SULFATE .5; 2.5 MG/3ML; MG/3ML
3 SOLUTION RESPIRATORY (INHALATION) EVERY 6 HOURS
Refills: 0 | Status: DISCONTINUED | OUTPATIENT
Start: 2025-04-26 | End: 2025-04-28

## 2025-04-26 RX ORDER — AZITHROMYCIN 250 MG
500 CAPSULE ORAL ONCE
Refills: 0 | Status: COMPLETED | OUTPATIENT
Start: 2025-04-26 | End: 2025-04-26

## 2025-04-26 RX ORDER — METHYLPREDNISOLONE ACETATE 80 MG/ML
40 INJECTION, SUSPENSION INTRA-ARTICULAR; INTRALESIONAL; INTRAMUSCULAR; SOFT TISSUE ONCE
Refills: 0 | Status: COMPLETED | OUTPATIENT
Start: 2025-04-26 | End: 2025-04-26

## 2025-04-26 RX ORDER — METHYLPREDNISOLONE ACETATE 80 MG/ML
40 INJECTION, SUSPENSION INTRA-ARTICULAR; INTRALESIONAL; INTRAMUSCULAR; SOFT TISSUE EVERY 12 HOURS
Refills: 0 | Status: DISCONTINUED | OUTPATIENT
Start: 2025-04-26 | End: 2025-04-29

## 2025-04-26 RX ORDER — CEFTRIAXONE 500 MG/1
1000 INJECTION, POWDER, FOR SOLUTION INTRAMUSCULAR; INTRAVENOUS EVERY 24 HOURS
Refills: 0 | Status: DISCONTINUED | OUTPATIENT
Start: 2025-04-27 | End: 2025-04-29

## 2025-04-26 RX ORDER — AZITHROMYCIN 250 MG
500 CAPSULE ORAL EVERY 24 HOURS
Refills: 0 | Status: DISCONTINUED | OUTPATIENT
Start: 2025-04-27 | End: 2025-04-29

## 2025-04-26 RX ORDER — ACETAMINOPHEN 500 MG/5ML
650 LIQUID (ML) ORAL EVERY 6 HOURS
Refills: 0 | Status: DISCONTINUED | OUTPATIENT
Start: 2025-04-26 | End: 2025-04-29

## 2025-04-26 RX ORDER — VANCOMYCIN HCL IN 5 % DEXTROSE 1.5G/250ML
1000 PLASTIC BAG, INJECTION (ML) INTRAVENOUS ONCE
Refills: 0 | Status: COMPLETED | OUTPATIENT
Start: 2025-04-26 | End: 2025-04-26

## 2025-04-26 RX ORDER — CEFEPIME 2 G/20ML
1000 INJECTION, POWDER, FOR SOLUTION INTRAVENOUS ONCE
Refills: 0 | Status: COMPLETED | OUTPATIENT
Start: 2025-04-26 | End: 2025-04-26

## 2025-04-26 RX ORDER — AZITHROMYCIN 250 MG
CAPSULE ORAL
Refills: 0 | Status: DISCONTINUED | OUTPATIENT
Start: 2025-04-26 | End: 2025-04-29

## 2025-04-26 RX ADMIN — Medication 1000 MILLILITER(S): at 12:44

## 2025-04-26 RX ADMIN — Medication 250 MILLIGRAM(S): at 16:06

## 2025-04-26 RX ADMIN — IPRATROPIUM BROMIDE AND ALBUTEROL SULFATE 3 MILLILITER(S): .5; 2.5 SOLUTION RESPIRATORY (INHALATION) at 12:53

## 2025-04-26 RX ADMIN — Medication 400 MILLIGRAM(S): at 12:45

## 2025-04-26 RX ADMIN — IPRATROPIUM BROMIDE AND ALBUTEROL SULFATE 3 MILLILITER(S): .5; 2.5 SOLUTION RESPIRATORY (INHALATION) at 12:44

## 2025-04-26 RX ADMIN — METHYLPREDNISOLONE ACETATE 40 MILLIGRAM(S): 80 INJECTION, SUSPENSION INTRA-ARTICULAR; INTRALESIONAL; INTRAMUSCULAR; SOFT TISSUE at 12:44

## 2025-04-26 RX ADMIN — Medication 250 MILLIGRAM(S): at 19:43

## 2025-04-26 RX ADMIN — CEFEPIME 100 MILLIGRAM(S): 2 INJECTION, POWDER, FOR SOLUTION INTRAVENOUS at 15:43

## 2025-04-26 NOTE — ED PROVIDER NOTE - PROGRESS NOTE DETAILS
Attending Penelope Olmedo: Patient found to be hypoxic to 85% on room air and placed on supplemental oxygen.  Patient with diffuse wheezing.  Breathing treatment ordered. Attending Penelope Olmedo: ekg shows evidence of NSR rate of 100 no st elevation or depression. qtc of 441ms Patient CT results reviewed, received antibiotics, pt stable, family updated, [id number]

## 2025-04-26 NOTE — H&P ADULT - HISTORY OF PRESENT ILLNESS
Patient is a poor historian. Unable to reach son, GAMAL Edge despite 2 phone calls. Additional hx obtained via chart review.    79M w/hx of ILD (on home O2), HTN, HLD, seizures, dementia presents due to worsening cough and decreased appetite for several days. Patient reports feeling better now. Patient does not demonstrate good understanding of underlying ILD. Reports he has been to many hospitals for similar respiratory issues. Reports he would like to be discharged tomorrow. Reports improvement of breathing. denies sick contacts. Patient denies fever, chills, chest pain, headache, dizziness, abd pain, nausea, vomiting.

## 2025-04-26 NOTE — H&P ADULT - PROBLEM SELECTOR PLAN 8
Unable to reach son, GAMAL Edge x2 calls  - Please confirm medications with son, med rec based off surescripts

## 2025-04-26 NOTE — H&P ADULT - NSHPPHYSICALEXAM_GEN_ALL_CORE
T(C): 36.4 (04-26-25 @ 21:09), Max: 36.8 (04-26-25 @ 13:12)  HR: 73 (04-26-25 @ 21:09) (67 - 104)  BP: 117/70 (04-26-25 @ 21:09) (117/70 - 141/80)  RR: 17 (04-26-25 @ 21:09) (17 - 24)  SpO2: 97% (04-26-25 @ 21:09) (95% - 100%)    CONSTITUTIONAL: No apparent distress  EYES: PERRLA and symmetric, EOMI, No conjunctival or scleral injection, non-icteric  ENMT: Oral mucosa with moist membranes.  NECK: Supple, symmetric. No JVD.  RESP: No respiratory distress, no use of accessory muscles; CTA b/l, no WRR  CV: RRR, +S1S2, no MRG  GI: Soft, NT, ND, no rebound, no guarding  : No suprapubic tenderness. No CVA tenderness.  LYMPH: No cervical LAD or tenderness  MSK: No spinal tenderness, normal muscle strength/tone  EXTREMITIES: No pedal edema. Digital clubbing.  SKIN: No rashes or ulcers noted  NEURO: A+Ox3, responsive. No tremor  PSYCH: Appropriate insight/judgment; mood and affect appropriate T(C): 36.4 (04-26-25 @ 21:09), Max: 36.8 (04-26-25 @ 13:12)  HR: 73 (04-26-25 @ 21:09) (67 - 104)  BP: 117/70 (04-26-25 @ 21:09) (117/70 - 141/80)  RR: 17 (04-26-25 @ 21:09) (17 - 24)  SpO2: 97% (04-26-25 @ 21:09) (95% - 100%)    CONSTITUTIONAL: No apparent distress  EYES: PERRLA and symmetric, EOMI, No conjunctival or scleral injection, non-icteric  ENMT: Oral mucosa with moist membranes.  NECK: Supple, symmetric. No JVD.  RESP: No respiratory distress, no use of accessory muscles; velcro-like crackles b/l  CV: RRR, +S1S2, no MRG  GI: Soft, NT, ND, no rebound, no guarding  : No suprapubic tenderness. No CVA tenderness.  LYMPH: No cervical LAD or tenderness  MSK: No spinal tenderness, normal muscle strength/tone  EXTREMITIES: No pedal edema. Digital clubbing present.  SKIN: No rashes or ulcers noted  NEURO: A+Ox3, responsive. No tremor  PSYCH: Appropriate insight/judgment; mood and affect appropriate

## 2025-04-26 NOTE — ED ADULT NURSE NOTE - OBJECTIVE STATEMENT
Pt came in c/o increased work of breathing and  productive cough. Pt has a hc: HTN  and known interstitial lung disease.  patient is awake and tachypneic. upon arrival; to the ER. Pt with dyspnea on exertion. Pt's daughter at bedside. No chills. no diaphoresis. Pt also c/o generalized body aches.

## 2025-04-26 NOTE — H&P ADULT - PROBLEM SELECTOR PLAN 1
Patient with acute on chronic resp failure 2/2 to ILD flare and/or pneumonia  - Will cover with abx and steroids as below  - Titrate oxygen down to baseline as tolerated  - Obtain pulm consult in AM for recommendations based on likely progression of ILD

## 2025-04-26 NOTE — H&P ADULT - PROBLEM SELECTOR PLAN 2
Patient with possible pneumonia/multi-focal pneumonia based on CT/CXR  - Empirically cover with ceftriaxone and azithromycin  - Follow cultures

## 2025-04-26 NOTE — PATIENT PROFILE ADULT - MST ORDER GENERATE MLM HIDDEN
Last office visit:  06/17/22      Next office visit:  07/20/22     Surgery:  DOS: 04/22/22 LUMBAR 3-LUMBAR 4 AND LUMBAR 4-5 LAMINECTOMY WITH  partial facetectomy &  LUMBAR 3-LUMBAR 5 POSTERIOR INSTRUMENTATION AND FUSION      Date last filled:  Percocet 06/09/22     Is the patient due for refill of this medication(s):  Yes     PDMP review:  Criteria met. Forwarded to Physician/YVAN for signature.      Plan:  Script will be decreased to 1 tablet every 8 hours as needed for pain      Patient contacted and updated that refill request was received and will be sent to Balwinder to sign.  Explained that script will be decreased to 1 tablet every 8 hours as needed for pain to begin weaning patient off pain medications.  Patient verbalized understanding and thanked writer for calling.    
MED:    oxyCODONE-acetaminophen (Percocet) 5-325 MG per tablet    Prescription to be e-scribed to patients confirmed pharmacy.    
MST Score 2 or >

## 2025-04-26 NOTE — ED PROVIDER NOTE - PHYSICAL EXAMINATION
Attending Penelope Olmedo: Gen: increaed wob heent: atrauamtic, eomi, perrla, mmm, op pink, uvula midline,  cv: rrr, no murmurs, lungs: diffuse wheezing, abd: soft, nontender, nondistended, no peritoneal signs, , no guarding, ext: wwp, neg homans, skin: no rash, neuro: awake and alert, following commands, speech clear, sensation and strength intact, no focal deficits

## 2025-04-26 NOTE — PATIENT PROFILE ADULT - NSPROEXTENSIONSOFSELF_GEN_A_NUR
Pt missed appointment due to an issue with police but asks if we have any results on his cts?      Please advise cane/dentures

## 2025-04-26 NOTE — ED PROVIDER NOTE - CLINICAL SUMMARY MEDICAL DECISION MAKING FREE TEXT BOX
Attending Penelope Olmedo: 79-year-old male multimedical issues including history of, high blood pressure, presenting with concern for increased work of breathing as well as cough.  Upon arrival patient is awake and tachypneic.  Upon exam patient with diffuse wheezing and scattered crackles.  Patient denies any chest pain.  Not any blood thinners.  Patient also performed showing diffuse aches.  He lives with associated pleural thickening.  Unclear what this is secondary to patient's known interstitial lung disease versus pneumonia versus viral infection.  No lower extremity edema and patient denies any pleuritic pain making pulmonary embolism less likely.  Patient does have diffuse wheezing.  Will obtain labs, cultures, DuoNebs, give steroids and likely to obtain CT imaging Attending Penelope Olmedo: 79-year-old male multimedical issues including history of, high blood pressure, presenting with concern for increased work of breathing as well as cough.  Upon arrival patient is awake and tachypneic.  Upon exam patient with diffuse wheezing and scattered crackles.  Patient denies any chest pain.  Not any blood thinners.  Patient also performed showing diffuse aches.  He lives with associated pleural thickening.  Unclear what this is secondary to patient's known interstitial lung disease versus pneumonia versus viral infection.  No lower extremity edema and patient denies any pleuritic pain making pulmonary embolism less likely.  Patient does have diffuse wheezing.  Will obtain labs, cultures, DuoNebs, give steroids and likely to obtain CT imaging. consider possible infectious , viral vs bacterial with pna vs ild exacerbation.

## 2025-04-26 NOTE — ED PROVIDER NOTE - ATTENDING APP SHARED VISIT CONTRIBUTION OF CARE
Attending MD Penelope Olmedo:  I personally made/approved the management plan and take responsibility for the patient management.  Physician assistant note reviewed and agree on plan of care and except where noted.  See HPI, PE, and MDM for details.

## 2025-04-26 NOTE — H&P ADULT - ASSESSMENT
79M w/hx of ILD (on home O2), HTN, HLD, seizures, dementia presents due to worsening cough and decreased appetite for several days.     Admit for AHRF 2/2 to ILD flare vs pneumonia.

## 2025-04-26 NOTE — ED PROVIDER NOTE - OBJECTIVE STATEMENT
79yom pmhx of ILD HTN HLD BIB family for decrease PO intake, worsening chronic cough and high BP. Family reports pt with generalized weakness and decrease activity at home and feeling more sob. NO vomiting. No sick contacts. No abd pain.

## 2025-04-27 DIAGNOSIS — J84.9 INTERSTITIAL PULMONARY DISEASE, UNSPECIFIED: ICD-10-CM

## 2025-04-27 DIAGNOSIS — Z79.899 OTHER LONG TERM (CURRENT) DRUG THERAPY: ICD-10-CM

## 2025-04-27 DIAGNOSIS — I10 ESSENTIAL (PRIMARY) HYPERTENSION: ICD-10-CM

## 2025-04-27 DIAGNOSIS — J96.01 ACUTE RESPIRATORY FAILURE WITH HYPOXIA: ICD-10-CM

## 2025-04-27 DIAGNOSIS — J18.9 PNEUMONIA, UNSPECIFIED ORGANISM: ICD-10-CM

## 2025-04-27 DIAGNOSIS — F03.90 UNSPECIFIED DEMENTIA, UNSPECIFIED SEVERITY, WITHOUT BEHAVIORAL DISTURBANCE, PSYCHOTIC DISTURBANCE, MOOD DISTURBANCE, AND ANXIETY: ICD-10-CM

## 2025-04-27 DIAGNOSIS — E78.5 HYPERLIPIDEMIA, UNSPECIFIED: ICD-10-CM

## 2025-04-27 DIAGNOSIS — R56.9 UNSPECIFIED CONVULSIONS: ICD-10-CM

## 2025-04-27 LAB
A1C WITH ESTIMATED AVERAGE GLUCOSE RESULT: 6.4 % — HIGH (ref 4–5.6)
ALBUMIN SERPL ELPH-MCNC: 3.5 G/DL — SIGNIFICANT CHANGE UP (ref 3.3–5)
ALP SERPL-CCNC: 69 U/L — SIGNIFICANT CHANGE UP (ref 40–120)
ALT FLD-CCNC: 12 U/L — SIGNIFICANT CHANGE UP (ref 10–45)
ANION GAP SERPL CALC-SCNC: 12 MMOL/L — SIGNIFICANT CHANGE UP (ref 5–17)
AST SERPL-CCNC: 21 U/L — SIGNIFICANT CHANGE UP (ref 10–40)
BILIRUB SERPL-MCNC: 0.6 MG/DL — SIGNIFICANT CHANGE UP (ref 0.2–1.2)
BUN SERPL-MCNC: 18 MG/DL — SIGNIFICANT CHANGE UP (ref 7–23)
CALCIUM SERPL-MCNC: 9.5 MG/DL — SIGNIFICANT CHANGE UP (ref 8.4–10.5)
CHLORIDE SERPL-SCNC: 104 MMOL/L — SIGNIFICANT CHANGE UP (ref 96–108)
CHOLEST SERPL-MCNC: 136 MG/DL — SIGNIFICANT CHANGE UP
CO2 SERPL-SCNC: 25 MMOL/L — SIGNIFICANT CHANGE UP (ref 22–31)
CREAT SERPL-MCNC: 0.78 MG/DL — SIGNIFICANT CHANGE UP (ref 0.5–1.3)
CULTURE RESULTS: SIGNIFICANT CHANGE UP
EGFR: 92 ML/MIN/1.73M2 — SIGNIFICANT CHANGE UP
EGFR: 92 ML/MIN/1.73M2 — SIGNIFICANT CHANGE UP
ESTIMATED AVERAGE GLUCOSE: 137 MG/DL — HIGH (ref 68–114)
GLUCOSE SERPL-MCNC: 129 MG/DL — HIGH (ref 70–99)
HCT VFR BLD CALC: 40.8 % — SIGNIFICANT CHANGE UP (ref 39–50)
HDLC SERPL-MCNC: 39 MG/DL — LOW
HGB BLD-MCNC: 12.9 G/DL — LOW (ref 13–17)
LDLC SERPL-MCNC: 83 MG/DL — SIGNIFICANT CHANGE UP
LIPID PNL WITH DIRECT LDL SERPL: 83 MG/DL — SIGNIFICANT CHANGE UP
MCHC RBC-ENTMCNC: 29.3 PG — SIGNIFICANT CHANGE UP (ref 27–34)
MCHC RBC-ENTMCNC: 31.6 G/DL — LOW (ref 32–36)
MCV RBC AUTO: 92.7 FL — SIGNIFICANT CHANGE UP (ref 80–100)
NONHDLC SERPL-MCNC: 97 MG/DL — SIGNIFICANT CHANGE UP
NRBC BLD AUTO-RTO: 0 /100 WBCS — SIGNIFICANT CHANGE UP (ref 0–0)
PLATELET # BLD AUTO: 193 K/UL — SIGNIFICANT CHANGE UP (ref 150–400)
POTASSIUM SERPL-MCNC: 4.6 MMOL/L — SIGNIFICANT CHANGE UP (ref 3.5–5.3)
POTASSIUM SERPL-SCNC: 4.6 MMOL/L — SIGNIFICANT CHANGE UP (ref 3.5–5.3)
PROT SERPL-MCNC: 7.4 G/DL — SIGNIFICANT CHANGE UP (ref 6–8.3)
RBC # BLD: 4.4 M/UL — SIGNIFICANT CHANGE UP (ref 4.2–5.8)
RBC # FLD: 14.1 % — SIGNIFICANT CHANGE UP (ref 10.3–14.5)
SODIUM SERPL-SCNC: 141 MMOL/L — SIGNIFICANT CHANGE UP (ref 135–145)
SPECIMEN SOURCE: SIGNIFICANT CHANGE UP
TRIGL SERPL-MCNC: 64 MG/DL — SIGNIFICANT CHANGE UP
WBC # BLD: 7.14 K/UL — SIGNIFICANT CHANGE UP (ref 3.8–10.5)
WBC # FLD AUTO: 7.14 K/UL — SIGNIFICANT CHANGE UP (ref 3.8–10.5)

## 2025-04-27 PROCEDURE — 99232 SBSQ HOSP IP/OBS MODERATE 35: CPT

## 2025-04-27 RX ORDER — ASPIRIN 325 MG
81 TABLET ORAL DAILY
Refills: 0 | Status: DISCONTINUED | OUTPATIENT
Start: 2025-04-27 | End: 2025-04-29

## 2025-04-27 RX ORDER — PRIMIDONE 50 MG
50 TABLET ORAL DAILY
Refills: 0 | Status: DISCONTINUED | OUTPATIENT
Start: 2025-04-27 | End: 2025-04-28

## 2025-04-27 RX ORDER — LOSARTAN POTASSIUM 100 MG/1
25 TABLET, FILM COATED ORAL DAILY
Refills: 0 | Status: DISCONTINUED | OUTPATIENT
Start: 2025-04-27 | End: 2025-04-29

## 2025-04-27 RX ORDER — ATORVASTATIN CALCIUM 80 MG/1
20 TABLET, FILM COATED ORAL AT BEDTIME
Refills: 0 | Status: DISCONTINUED | OUTPATIENT
Start: 2025-04-27 | End: 2025-04-29

## 2025-04-27 RX ORDER — MELOXICAM 15 MG/1
1 TABLET ORAL
Refills: 0 | DISCHARGE

## 2025-04-27 RX ORDER — DONEPEZIL HYDROCHLORIDE 5 MG/1
10 TABLET ORAL AT BEDTIME
Refills: 0 | Status: DISCONTINUED | OUTPATIENT
Start: 2025-04-27 | End: 2025-04-29

## 2025-04-27 RX ADMIN — METHYLPREDNISOLONE ACETATE 40 MILLIGRAM(S): 80 INJECTION, SUSPENSION INTRA-ARTICULAR; INTRALESIONAL; INTRAMUSCULAR; SOFT TISSUE at 06:06

## 2025-04-27 RX ADMIN — LOSARTAN POTASSIUM 25 MILLIGRAM(S): 100 TABLET, FILM COATED ORAL at 06:06

## 2025-04-27 RX ADMIN — DONEPEZIL HYDROCHLORIDE 10 MILLIGRAM(S): 5 TABLET ORAL at 21:46

## 2025-04-27 RX ADMIN — Medication 81 MILLIGRAM(S): at 10:42

## 2025-04-27 RX ADMIN — ATORVASTATIN CALCIUM 20 MILLIGRAM(S): 80 TABLET, FILM COATED ORAL at 21:46

## 2025-04-27 RX ADMIN — Medication 10 MILLIGRAM(S): at 10:42

## 2025-04-27 RX ADMIN — Medication 250 MILLIGRAM(S): at 17:41

## 2025-04-27 RX ADMIN — METHYLPREDNISOLONE ACETATE 40 MILLIGRAM(S): 80 INJECTION, SUSPENSION INTRA-ARTICULAR; INTRALESIONAL; INTRAMUSCULAR; SOFT TISSUE at 17:39

## 2025-04-27 RX ADMIN — Medication 50 MILLIGRAM(S): at 10:42

## 2025-04-27 RX ADMIN — CEFTRIAXONE 100 MILLIGRAM(S): 500 INJECTION, POWDER, FOR SOLUTION INTRAMUSCULAR; INTRAVENOUS at 06:10

## 2025-04-27 NOTE — CONSULT NOTE ADULT - ATTENDING COMMENTS
79-year-old male history of ILD on chronic prednisone therapy with poor history of poor follow-up who presented 4/26/2025 with worsening cough and decreased appetite for several days.  On presentation patient noted to be afebrile and otherwise hemodynamically stable saturating well on 2 L nasal cannula.  CT chest performed showing extensive pulmonary fibrosis with UIP pattern and small areas of groundglass in the right upper lobe.  Pulmonary consulted given history of fibrosis as well as CT findings.    # Pulmonary fibrosis  – Patient's current status may represent current baseline of his respiratory status versus acute exacerbation  - He has had serologic workup performed in the past previously showing positive VINCENT and dsDNA however repeat was negative, recent RF positive following with Dr. Garnica   – Minimal groundglass noted however extensive reticulations with fibrosis  – Can increase steroids to prednisone 40 mg  – Would continue infective workup  – Obtain echo and proBNP  – Continue bronchodilators  – Patient has had poor pulmonary and rheumatology follow-up in the past will need to stress importance of follow-up given extensive disease  - Pt will need home O2 qualification  – Would obtain palliative consultation at this visit given extensive disease with poor follow-up

## 2025-04-27 NOTE — PROGRESS NOTE ADULT - PROBLEM SELECTOR PLAN 1
Patient with acute on chronic resp failure 2/2 to ILD flare and/or pneumonia  - Will cover with abx and steroids as below  - Titrate oxygen down to baseline as tolerated  - Pulm consulted, f/u recs

## 2025-04-27 NOTE — CONSULT NOTE ADULT - SUBJECTIVE AND OBJECTIVE BOX
79 year old male with history of ILD. History of positive VINCENT as well as positive dsDNA in 2021 however most recent bloodwork Dec 2024 showed negative dsDNA, elevated ESR and positive rheumatoid factor. Saw rheumatologist Dr. 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 Patient still with productive cough, typically yellow and some shortness of breath when ambulating at home. Is still taking prednisone 10mg daily. Denies wheezing, chest tightness.  ?  NOTE INCOMPLETE CHIEF COMPLAINT: sob    HPI:  79 year old male with history of ILD on chronic Pred 10mg p/f SOB on exertion and cough. Reports both have been present since 2017. Poor historian, doesn't sound like it's been acutely worsening. He follows with pulm (Dr Hull) but poor follow up. History of positive VINCENT as well as positive dsDNA in 2021 however most recent bloodwork Dec 2024 showed negative dsDNA, elevated ESR and positive rheumatoid factor. Saw rheumatologist Dr. 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 Patient with productive cough, typically yellow and some shortness of breath when ambulating at home. Is taking prednisone 10mg daily and endorses compliance. Denies wheezing, chest tightness.    CT chest performed showing extensive pulmonary fibrosis with UIP pattern and small areas of groundglass in the right upper lobe. Pulm c/f assistance with ILD mng.    PAST MEDICAL & SURGICAL HISTORY:  History of chronic cough      Hemoptysis      No significant past surgical history          FAMILY HISTORY:  No pertinent family history in first degree relatives        SOCIAL HISTORY:  Smoking:x  Substance Use:x  EtOH Use:x  Country of Birth: [place]      Allergies    No Known Allergies    Intolerances        HOME MEDICATIONS:    REVIEW OF SYSTEMS:  Constitutional: [x] negative [ ] fevers [ ] chills [ ] weight loss [ ] weight gain  HEENT: [x] negative [ ] dry eyes [ ] eye irritation [ ] postnasal drip [ ] nasal congestion  CV: [x] negative  [ ] chest pain [ ] orthopnea [ ] palpitations [ ] murmur  Resp: [x] negative [ ] cough [ ] shortness of breath [ ] dyspnea [ ] wheezing [ ] sputum [ ] hemoptysis  GI: [x] negative [ ] nausea [ ] vomiting [ ] diarrhea [ ] constipation [ ] abd pain [ ] dysphagia   : [x] negative [ ] dysuria [ ] nocturia [ ] hematuria [ ] increased urinary frequency  Musculoskeletal: [x] negative [ ] back pain [ ] myalgias [ ] arthralgias [ ] fracture  Skin: [x] negative [ ] rash [ ] itch  Neurological: [x] negative [ ] headache [ ] dizziness [ ] syncope [ ] weakness [ ] numbness  Psychiatric: [x] negative [ ] anxiety [ ] depression  Endocrine: [x] negative [ ] diabetes [ ] thyroid problem  Hematologic/Lymphatic: [x] negative [ ] anemia [ ] bleeding problem  Allergic/Immunologic: [x] negative [ ] itchy eyes [ ] nasal discharge [ ] hives [ ] angioedema  [x] All other systems negative  [ ] Unable to assess ROS because ________    OBJECTIVE:  ICU Vital Signs Last 24 Hrs  T(C): 36.4 (27 Apr 2025 19:57), Max: 36.5 (27 Apr 2025 05:01)  T(F): 97.6 (27 Apr 2025 19:57), Max: 97.7 (27 Apr 2025 05:01)  HR: 73 (27 Apr 2025 19:57) (73 - 82)  BP: 112/63 (27 Apr 2025 19:57) (111/62 - 118/70)  BP(mean): --  ABP: --  ABP(mean): --  RR: 18 (27 Apr 2025 19:57) (18 - 18)  SpO2: 96% (27 Apr 2025 19:57) (92% - 96%)    O2 Parameters below as of 27 Apr 2025 19:57  Patient On (Oxygen Delivery Method): nasal cannula  O2 Flow (L/min): 1            04-26 @ 07:01 - 04-27 @ 07:00  --------------------------------------------------------  IN: 0 mL / OUT: 525 mL / NET: -525 mL    04-27 @ 07:01 - 04-27 @ 21:16  --------------------------------------------------------  IN: 480 mL / OUT: 0 mL / NET: 480 mL      CAPILLARY BLOOD GLUCOSE          PHYSICAL EXAM:  General: NAD  HEENT: MMM, PERRLA  Respiratory: bilat crackles  Cardiovascular: RRR, no MRG  Abdomen: NTND  Neurological: AOx3, no gross deficits    HOSPITAL MEDICATIONS:  aspirin enteric coated 81 milliGRAM(s) Oral daily    azithromycin  IVPB      azithromycin  IVPB 500 milliGRAM(s) IV Intermittent every 24 hours  cefTRIAXone   IVPB 1000 milliGRAM(s) IV Intermittent every 24 hours    losartan 25 milliGRAM(s) Oral daily    atorvastatin 20 milliGRAM(s) Oral at bedtime  methylPREDNISolone sodium succinate Injectable 40 milliGRAM(s) IV Push every 12 hours    albuterol/ipratropium for Nebulization 3 milliLiter(s) Nebulizer every 6 hours PRN  cetirizine 10 milliGRAM(s) Oral daily    acetaminophen     Tablet .. 650 milliGRAM(s) Oral every 6 hours PRN  donepezil 10 milliGRAM(s) Oral at bedtime  melatonin 3 milliGRAM(s) Oral at bedtime PRN  primidone 50 milliGRAM(s) Oral daily                    LABS:                        12.9   7.14  )-----------( 193      ( 27 Apr 2025 07:05 )             40.8     Hgb Trend: 12.9<--, 14.2<--  04-27    141  |  104  |  18  ----------------------------<  129[H]  4.6   |  25  |  0.78    Ca    9.5      27 Apr 2025 07:05  Phos  2.9     04-26  Mg     2.1     04-26    TPro  7.4  /  Alb  3.5  /  TBili  0.6  /  DBili  x   /  AST  21  /  ALT  12  /  AlkPhos  69  04-27    Creatinine Trend: 0.78<--, 0.94<--  PT/INR - ( 26 Apr 2025 13:01 )   PT: 13.5 sec;   INR: 1.19 ratio         PTT - ( 26 Apr 2025 13:01 )  PTT:29.2 sec  Urinalysis Basic - ( 27 Apr 2025 07:05 )    Color: x / Appearance: x / SG: x / pH: x  Gluc: 129 mg/dL / Ketone: x  / Bili: x / Urobili: x   Blood: x / Protein: x / Nitrite: x   Leuk Esterase: x / RBC: x / WBC x   Sq Epi: x / Non Sq Epi: x / Bacteria: x        Venous Blood Gas:  04-26 @ 15:20  7.39/46/71/28/94.5  VBG Lactate: 0.8  Venous Blood Gas:  04-26 @ 12:15  7.34/58/27/31/40.3  VBG Lactate: 2.2      MICROBIOLOGY:     RADIOLOGY:  [x] Reviewed and interpreted by me    PULMONARY FUNCTION TESTS:    EKG:

## 2025-04-27 NOTE — CONSULT NOTE ADULT - ASSESSMENT
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    NOTE INCOMPLETE  For ILD, on chronic prednisone 10mg daily.   UIP pattern  Saw Rheum (Dr Olivares) on 12/2024 -- repeated serologies and undergoing w/u with tx plan depending on results; pt needs to f/up with Dr Olivares  Due for repeat CT chest 04/2025, it is pending.    close f/up with Dr Hull on d/c  NOTE INCOMPLETE 79 year old male with history of ILD on chronic Pred 10mg p/f SOB on exertion and cough, both chronic ~7 yrs. CT chest performed showing extensive pulmonary fibrosis with UIP pattern and small areas of groundglass in the right upper lobe. Pulm c/f ILD mng.    #UIP  #chronic cough  #You    UIP possibly 2/2 ILD, although typically would be NSIP pattern. Hx of +VINCENT and +dsDNA, however recent bloodwork with -dsDNA.    - increase home prednisone to 40mg daily  - maintain euvolemia  - r/o infectious process -- obtain sp cx if able and complete RVP, and urine strep/leg (if urine Ags negative, then consider stopping abx as less likely CAP process  - obtain TTE  - obtain proBNP  - given chronicity of sxs and extensive fibrosis on CT, this could very well be his new baseline respiratory status and not an acute worsening; but still requires above investigation  - standing duoneb q6h (currently PRN)  - Saw Rheum (Dr Olivares) on 12/2024 -- repeated serologies and undergoing w/u with tx plan depending on results; pt needs to f/up with Dr Olivares  - close f/up with Dr Hull on d/c

## 2025-04-27 NOTE — PROGRESS NOTE ADULT - SUBJECTIVE AND OBJECTIVE BOX
PROGRESS NOTE:   Authored by Dr. Carmela Crook MD, Available on MS Teams    Patient is a 79y old  Male who presents with a chief complaint of ILD, pneumonia (27 Apr 2025 10:49)      SUBJECTIVE / OVERNIGHT EVENTS: Patient has a productive cough but otherwise breathing is a little better today. Down to 1L NC. Son and daughter in law at bedside translating     ADDITIONAL REVIEW OF SYSTEMS:    MEDICATIONS  (STANDING):  aspirin enteric coated 81 milliGRAM(s) Oral daily  atorvastatin 20 milliGRAM(s) Oral at bedtime  azithromycin  IVPB 500 milliGRAM(s) IV Intermittent every 24 hours  azithromycin  IVPB      cefTRIAXone   IVPB 1000 milliGRAM(s) IV Intermittent every 24 hours  cetirizine 10 milliGRAM(s) Oral daily  donepezil 10 milliGRAM(s) Oral at bedtime  losartan 25 milliGRAM(s) Oral daily  methylPREDNISolone sodium succinate Injectable 40 milliGRAM(s) IV Push every 12 hours  primidone 50 milliGRAM(s) Oral daily    MEDICATIONS  (PRN):  acetaminophen     Tablet .. 650 milliGRAM(s) Oral every 6 hours PRN Temp greater or equal to 38C (100.4F), Mild Pain (1 - 3)  albuterol/ipratropium for Nebulization 3 milliLiter(s) Nebulizer every 6 hours PRN Shortness of Breath and/or Wheezing  melatonin 3 milliGRAM(s) Oral at bedtime PRN Insomnia      CAPILLARY BLOOD GLUCOSE        I&O's Summary    26 Apr 2025 07:01  -  27 Apr 2025 07:00  --------------------------------------------------------  IN: 0 mL / OUT: 525 mL / NET: -525 mL        PHYSICAL EXAM:  Vital Signs Last 24 Hrs  T(C): 36.4 (27 Apr 2025 11:48), Max: 36.7 (26 Apr 2025 19:21)  T(F): 97.5 (27 Apr 2025 11:48), Max: 98 (26 Apr 2025 19:21)  HR: 82 (27 Apr 2025 11:48) (67 - 82)  BP: 111/62 (27 Apr 2025 11:48) (111/62 - 141/80)  BP(mean): --  RR: 18 (27 Apr 2025 11:48) (17 - 20)  SpO2: 92% (27 Apr 2025 11:48) (92% - 99%)    Parameters below as of 27 Apr 2025 11:48  Patient On (Oxygen Delivery Method): nasal cannula  O2 Flow (L/min): 1      CONSTITUTIONAL: NAD  RESPIRATORY: Normal respiratory effort; crackles at the bases b/l, no wheezing   CARDIOVASCULAR: Regular rate and rhythm, normal S1 and S2, no murmur/rub/gallop; No lower extremity edema  ABDOMEN: Nontender to palpation, normoactive bowel sounds, no rebound/guarding  MUSCLOSKELETAL: no clubbing or cyanosis of digits; no joint swelling or tenderness to palpation  PSYCH: A+O to person, place, and time; affect appropriate    LABS:                        12.9   7.14  )-----------( 193      ( 27 Apr 2025 07:05 )             40.8     04-27    141  |  104  |  18  ----------------------------<  129[H]  4.6   |  25  |  0.78    Ca    9.5      27 Apr 2025 07:05  Phos  2.9     04-26  Mg     2.1     04-26    TPro  7.4  /  Alb  3.5  /  TBili  0.6  /  DBili  x   /  AST  21  /  ALT  12  /  AlkPhos  69  04-27    PT/INR - ( 26 Apr 2025 13:01 )   PT: 13.5 sec;   INR: 1.19 ratio         PTT - ( 26 Apr 2025 13:01 )  PTT:29.2 sec      Urinalysis Basic - ( 27 Apr 2025 07:05 )    Color: x / Appearance: x / SG: x / pH: x  Gluc: 129 mg/dL / Ketone: x  / Bili: x / Urobili: x   Blood: x / Protein: x / Nitrite: x   Leuk Esterase: x / RBC: x / WBC x   Sq Epi: x / Non Sq Epi: x / Bacteria: x

## 2025-04-28 LAB
ANION GAP SERPL CALC-SCNC: 11 MMOL/L — SIGNIFICANT CHANGE UP (ref 5–17)
BUN SERPL-MCNC: 24 MG/DL — HIGH (ref 7–23)
CALCIUM SERPL-MCNC: 9.3 MG/DL — SIGNIFICANT CHANGE UP (ref 8.4–10.5)
CHLORIDE SERPL-SCNC: 103 MMOL/L — SIGNIFICANT CHANGE UP (ref 96–108)
CO2 SERPL-SCNC: 25 MMOL/L — SIGNIFICANT CHANGE UP (ref 22–31)
CREAT SERPL-MCNC: 0.78 MG/DL — SIGNIFICANT CHANGE UP (ref 0.5–1.3)
EGFR: 92 ML/MIN/1.73M2 — SIGNIFICANT CHANGE UP
EGFR: 92 ML/MIN/1.73M2 — SIGNIFICANT CHANGE UP
GLUCOSE SERPL-MCNC: 157 MG/DL — HIGH (ref 70–99)
HCT VFR BLD CALC: 38.2 % — LOW (ref 39–50)
HGB BLD-MCNC: 12.1 G/DL — LOW (ref 13–17)
LEGIONELLA AG UR QL: NEGATIVE — SIGNIFICANT CHANGE UP
MAGNESIUM SERPL-MCNC: 2.2 MG/DL — SIGNIFICANT CHANGE UP (ref 1.6–2.6)
MCHC RBC-ENTMCNC: 29.4 PG — SIGNIFICANT CHANGE UP (ref 27–34)
MCHC RBC-ENTMCNC: 31.7 G/DL — LOW (ref 32–36)
MCV RBC AUTO: 92.7 FL — SIGNIFICANT CHANGE UP (ref 80–100)
MRSA PCR RESULT.: SIGNIFICANT CHANGE UP
NRBC BLD AUTO-RTO: 0 /100 WBCS — SIGNIFICANT CHANGE UP (ref 0–0)
PHOSPHATE SERPL-MCNC: 2.9 MG/DL — SIGNIFICANT CHANGE UP (ref 2.5–4.5)
PLATELET # BLD AUTO: 223 K/UL — SIGNIFICANT CHANGE UP (ref 150–400)
POTASSIUM SERPL-MCNC: 4.5 MMOL/L — SIGNIFICANT CHANGE UP (ref 3.5–5.3)
POTASSIUM SERPL-SCNC: 4.5 MMOL/L — SIGNIFICANT CHANGE UP (ref 3.5–5.3)
RBC # BLD: 4.12 M/UL — LOW (ref 4.2–5.8)
RBC # FLD: 14 % — SIGNIFICANT CHANGE UP (ref 10.3–14.5)
S AUREUS DNA NOSE QL NAA+PROBE: SIGNIFICANT CHANGE UP
S PNEUM AG UR QL: NEGATIVE — SIGNIFICANT CHANGE UP
SODIUM SERPL-SCNC: 139 MMOL/L — SIGNIFICANT CHANGE UP (ref 135–145)
WBC # BLD: 9.63 K/UL — SIGNIFICANT CHANGE UP (ref 3.8–10.5)
WBC # FLD AUTO: 9.63 K/UL — SIGNIFICANT CHANGE UP (ref 3.8–10.5)

## 2025-04-28 PROCEDURE — 99233 SBSQ HOSP IP/OBS HIGH 50: CPT | Mod: GC

## 2025-04-28 PROCEDURE — 99232 SBSQ HOSP IP/OBS MODERATE 35: CPT

## 2025-04-28 RX ORDER — LOSARTAN POTASSIUM 100 MG/1
1 TABLET, FILM COATED ORAL
Refills: 0 | DISCHARGE

## 2025-04-28 RX ORDER — ASPIRIN 325 MG
1 TABLET ORAL
Refills: 0 | DISCHARGE

## 2025-04-28 RX ORDER — IPRATROPIUM BROMIDE AND ALBUTEROL SULFATE .5; 2.5 MG/3ML; MG/3ML
3 SOLUTION RESPIRATORY (INHALATION) EVERY 6 HOURS
Refills: 0 | Status: DISCONTINUED | OUTPATIENT
Start: 2025-04-28 | End: 2025-04-29

## 2025-04-28 RX ORDER — PREDNISONE 20 MG/1
1 TABLET ORAL
Refills: 0 | DISCHARGE

## 2025-04-28 RX ORDER — DONEPEZIL HYDROCHLORIDE 5 MG/1
1 TABLET ORAL
Refills: 0 | DISCHARGE

## 2025-04-28 RX ORDER — ATORVASTATIN CALCIUM 80 MG/1
1 TABLET, FILM COATED ORAL
Refills: 0 | DISCHARGE

## 2025-04-28 RX ORDER — LORATADINE 5 MG/5ML
1 SOLUTION ORAL
Refills: 0 | DISCHARGE

## 2025-04-28 RX ORDER — PRIMIDONE 50 MG
1 TABLET ORAL
Refills: 0 | DISCHARGE

## 2025-04-28 RX ADMIN — CEFTRIAXONE 100 MILLIGRAM(S): 500 INJECTION, POWDER, FOR SOLUTION INTRAMUSCULAR; INTRAVENOUS at 06:20

## 2025-04-28 RX ADMIN — ATORVASTATIN CALCIUM 20 MILLIGRAM(S): 80 TABLET, FILM COATED ORAL at 21:31

## 2025-04-28 RX ADMIN — METHYLPREDNISOLONE ACETATE 40 MILLIGRAM(S): 80 INJECTION, SUSPENSION INTRA-ARTICULAR; INTRALESIONAL; INTRAMUSCULAR; SOFT TISSUE at 06:20

## 2025-04-28 RX ADMIN — IPRATROPIUM BROMIDE AND ALBUTEROL SULFATE 3 MILLILITER(S): .5; 2.5 SOLUTION RESPIRATORY (INHALATION) at 17:00

## 2025-04-28 RX ADMIN — METHYLPREDNISOLONE ACETATE 40 MILLIGRAM(S): 80 INJECTION, SUSPENSION INTRA-ARTICULAR; INTRALESIONAL; INTRAMUSCULAR; SOFT TISSUE at 17:00

## 2025-04-28 RX ADMIN — Medication 50 MILLIGRAM(S): at 09:47

## 2025-04-28 RX ADMIN — Medication 250 MILLIGRAM(S): at 17:00

## 2025-04-28 RX ADMIN — LOSARTAN POTASSIUM 25 MILLIGRAM(S): 100 TABLET, FILM COATED ORAL at 06:20

## 2025-04-28 RX ADMIN — Medication 81 MILLIGRAM(S): at 09:47

## 2025-04-28 RX ADMIN — Medication 10 MILLIGRAM(S): at 09:47

## 2025-04-28 RX ADMIN — DONEPEZIL HYDROCHLORIDE 10 MILLIGRAM(S): 5 TABLET ORAL at 21:31

## 2025-04-28 NOTE — PROGRESS NOTE ADULT - SUBJECTIVE AND OBJECTIVE BOX
[facility] Division of Hospital Medicine  Idris Bates DO  Reachable on [employer] Teams    Patient is a 79y old  Male who presents with a chief complaint of ILD, pneumonia    per H&P: "79M w/hx of ILD (on home O2), HTN, HLD, seizures, dementia presents due to worsening cough and decreased appetite for several days."  (28 Apr 2025 11:44)    SUBJECTIVE / OVERNIGHT EVENTS: No acute events overnight. Patient seen and examined at bedside this morning, endorses cough and shortness of breath. Discussed via  Viraj [id number].    REVIEW OF SYSTEMS:    CONSTITUTIONAL: No weakness, fevers or chills  EYES/ENT: No visual changes;  No vertigo or throat pain   NECK: No pain or stiffness  RESPIRATORY: Endorses cough and shortness of breath  CARDIOVASCULAR: No chest pain or palpitations  GASTROINTESTINAL: No abdominal or epigastric pain. No nausea, vomiting, or hematemesis; No diarrhea or constipation. No melena or hematochezia.  GENITOURINARY: No dysuria, frequency or hematuria  NEUROLOGICAL: No numbness or weakness  SKIN: No itching, burning, rashes, or lesions  MSK: No joint pain, no back pain  HEME: No easy bleeding, no easy bruising  All other review of systems is negative unless indicated above.    MEDICATIONS  (STANDING):  albuterol/ipratropium for Nebulization 3 milliLiter(s) Nebulizer every 6 hours  aspirin enteric coated 81 milliGRAM(s) Oral daily  atorvastatin 20 milliGRAM(s) Oral at bedtime  azithromycin  IVPB      azithromycin  IVPB 500 milliGRAM(s) IV Intermittent every 24 hours  cefTRIAXone   IVPB 1000 milliGRAM(s) IV Intermittent every 24 hours  cetirizine 10 milliGRAM(s) Oral daily  donepezil 10 milliGRAM(s) Oral at bedtime  losartan 25 milliGRAM(s) Oral daily  methylPREDNISolone sodium succinate Injectable 40 milliGRAM(s) IV Push every 12 hours  primidone 50 milliGRAM(s) Oral daily    MEDICATIONS  (PRN):  acetaminophen     Tablet .. 650 milliGRAM(s) Oral every 6 hours PRN Temp greater or equal to 38C (100.4F), Mild Pain (1 - 3)  melatonin 3 milliGRAM(s) Oral at bedtime PRN Insomnia      CAPILLARY BLOOD GLUCOSE        I&O's Summary    27 Apr 2025 07:01  -  28 Apr 2025 07:00  --------------------------------------------------------  IN: 480 mL / OUT: 0 mL / NET: 480 mL        PHYSICAL EXAM:  Vital Signs Last 24 Hrs  T(C): 36.8 (28 Apr 2025 12:20), Max: 36.8 (28 Apr 2025 04:51)  T(F): 98.2 (28 Apr 2025 12:20), Max: 98.3 (28 Apr 2025 04:51)  HR: 73 (28 Apr 2025 12:20) (68 - 73)  BP: 110/60 (28 Apr 2025 12:20) (110/60 - 121/72)  BP(mean): --  RR: 18 (28 Apr 2025 12:20) (18 - 18)  SpO2: 92% (28 Apr 2025 12:20) (92% - 96%)    Parameters below as of 28 Apr 2025 12:20  Patient On (Oxygen Delivery Method): nasal cannula  O2 Flow (L/min): 1    CONSTITUTIONAL: NAD, well-developed, well-groomed  RESPIRATORY: Normal respiratory effort; bilateral rales and rhonchi  CARDIOVASCULAR: Regular rate and rhythm, normal S1 and S2, no murmur/rub/gallop  ABDOMEN: Nontender to palpation, nondistended soft  PSYCH: A+O to person, place, and time; affect appropriate    LABS:                        12.1   9.63  )-----------( 223      ( 28 Apr 2025 07:14 )             38.2     04-28    139  |  103  |  24[H]  ----------------------------<  157[H]  4.5   |  25  |  0.78    Ca    9.3      28 Apr 2025 07:12  Phos  2.9     04-28  Mg     2.2     04-28    TPro  7.4  /  Alb  3.5  /  TBili  0.6  /  DBili  x   /  AST  21  /  ALT  12  /  AlkPhos  69  04-27          Urinalysis Basic - ( 28 Apr 2025 07:12 )    Color: x / Appearance: x / SG: x / pH: x  Gluc: 157 mg/dL / Ketone: x  / Bili: x / Urobili: x   Blood: x / Protein: x / Nitrite: x   Leuk Esterase: x / RBC: x / WBC x   Sq Epi: x / Non Sq Epi: x / Bacteria: x        Culture - Urine (collected 26 Apr 2025 17:53)  Source: Clean Catch Clean Catch (Midstream)  Final Report (27 Apr 2025 15:35):    <10,000 CFU/mL Normal Urogenital Karla    Culture - Blood (collected 26 Apr 2025 12:30)  Source: Blood Blood-Peripheral  Preliminary Report (27 Apr 2025 18:02):    No growth at 24 hours    Culture - Blood (collected 26 Apr 2025 12:15)  Source: Blood Blood-Peripheral  Preliminary Report (27 Apr 2025 18:02):    No growth at 24 hours

## 2025-04-28 NOTE — PHARMACOTHERAPY INTERVENTION NOTE - COMMENTS
Performed medication reconciliation and home medication list updated in prescription writer/ outpatient medication review. Medications verified with patient's daughter-in-law, Raj Clemente, and  pharmacy.     Home medications:  aspirin 81 mg oral tablet: 1 tab(s) orally once a day  cholecalciferol 1250 mcg (50,000 intl units) oral capsule: 1 cap(s) orally once a week  donepezil 10 mg oral tablet: 1 tab(s) orally once a day  Lipitor 20 mg oral tablet: 1 tab(s) orally once a day (at bedtime)  losartan 25 mg oral tablet: 1 tab(s) orally once a day  meloxicam 15 mg oral tablet: 1 tab(s) orally once a day as needed    Recommendations: Informed NP that medication reconciliation has been performed and that patient has not received Primidone from pharmacy since 1/2025. As per daughter-in-law, he is not currently taking this medication either.    Toy Abbasi, PharmD  PGY-1 Pharmacy Resident  Teams (preferred) or [phone]

## 2025-04-28 NOTE — DIETITIAN INITIAL EVALUATION ADULT - PERSON TAUGHT/METHOD
provided diet education on benefit of oral nutrition supplements to promote PO intake and weight stabilization. Discussed importance of adequate protein intake. Explained rationale for Ensure Max recommendation (low sugar, higher protein than Glucerna). Estimated good understanding of education provided. Pt's son agreeable to oral nutrition supplements on Pt's behalf. Pt/son made aware RD remains available and expressed understanding./son instructed

## 2025-04-28 NOTE — DIETITIAN INITIAL EVALUATION ADULT - ADD RECOMMEND
1. Continue current diet as ordered, as tolerated  2. Recommend Ensure Max once daily to provide 160 kcal, 30 grams protein, 1 gram sugar per 10 oz   3. Consider adding Multivitamin pending no medical contraindications, to optimize nutrient intake.   4. Monitor PO intake, PO diet tolerance, skin, weight, nutrition related labs, GI function, goals of care  1. Continue current diet as ordered, as tolerated  - monitor glucose levels with steroids in use and need for Consistent Carbohydrate therapeutic diet restriction   2. Recommend Ensure Max once daily to provide 160 kcal, 30 grams protein, 1 gram sugar per 10 oz   3. Consider adding Multivitamin pending no medical contraindications, to optimize nutrient intake.   4. Monitor PO intake, PO diet tolerance, skin, weight, nutrition related labs, GI function, goals of care

## 2025-04-28 NOTE — DIETITIAN INITIAL EVALUATION ADULT - PERTINENT LABORATORY DATA
04-28    139  |  103  |  24[H]  ----------------------------<  157[H]  4.5   |  25  |  0.78    Ca    9.3      28 Apr 2025 07:12  Phos  2.9     04-28  Mg     2.2     04-28    TPro  7.4  /  Alb  3.5  /  TBili  0.6  /  DBili  x   /  AST  21  /  ALT  12  /  AlkPhos  69  04-27  A1C with Estimated Average Glucose Result: 6.4 % (04-27-25 @ 07:05)

## 2025-04-28 NOTE — DIETITIAN INITIAL EVALUATION ADULT - OTHER INFO
solumedrol ordered in house  lipid panel from 4/27 reviewed, low HDL noted     Weights:  - UBW (per son): 170 pounds 2-3 years ago, Pt with progressive weight loss over 2-3 years per son. Estimated ~20 pound weight loss, reflects ~12% weight loss over 2-3 years (not clinically significant due to timeline)   - Dosing Weight (per chart): 149.9 pounds (4/26) - son reported dosing weight sounded accurate   -  pounds +/- 10%   - Per [facility] [place] HIE: 138 pounds (12/18/24), 135 pounds (12/9/24), 135 pounds (11/13/24)   RD to continue to monitor weights and trends as able.

## 2025-04-28 NOTE — DIETITIAN INITIAL EVALUATION ADULT - REASON INDICATOR FOR ASSESSMENT
RD consult warranted for MST score 2 or greater   Source: Patient, Electronic Medical Record, Buzz  Carmine ID #[id number], son GAMAL Edge over phone ([phone])  Chart reviewed, events noted.

## 2025-04-28 NOTE — DIETITIAN INITIAL EVALUATION ADULT - ORAL INTAKE PTA/DIET HISTORY
Pt visited at bedside, spoke to Pt via Buzz , [facility] dementia noted, Pt limited historian. Spoke with son GAMAL Edge via phone as well for additional information. Pt/son reports Pt having a fair appetite and PO intake PTA. Denied following any therapeutic diet restrictions, lacto vegetarian restriction currently in place per EMR. Denies any difficulty chewing/swallowing at this time. Denture cup noted at bedside. Pt denies any known food allergies or intolerances/no allergies noted per EMR. Cholecalciferol noted per home medication list. Son reported Pt takes Glucerna oral supplement at home to promote PO intake.

## 2025-04-28 NOTE — DIETITIAN INITIAL EVALUATION ADULT - OTHER CALCULATIONS
defer fluid needs to medical team discretion  Estimated protein-energy needs calculated using dosing weight

## 2025-04-28 NOTE — DIETITIAN INITIAL EVALUATION ADULT - NSFNSPHYEXAMSKINFT_GEN_A_CORE
Alen Méndez is a 48 y.o. female who was seen by synchronous (real-time) audio-video technology on 10/19/2020 for Follow Up Chronic Condition and Results (xray)        Assessment & Plan:   Diagnoses and all orders for this visit:    1. Strain of lumbar region, subsequent encounter    Other orders  -     ketorolac (TORADOL) 10 mg tablet; Take 1 Tab by mouth every six (6) hours as needed for Pain. -     tiZANidine (ZANAFLEX) 4 mg tablet; One tab PO TID prn muscle spasm. Use for up to 10 days. Low back pain started 9/28/20- seems to be muscular in origin but as this is preventing her from returning to work will do additional evaluation and has referral her to orthopedics and continue physical therapy. Discussed importance of f/u. She will call sooner for any problems or new symptoms. will rx toradol for 5 days and then she will switch to motrin. discussed only using this for 5 days due to increased risk of gastrointestinal side effects. She was advised then to switch to over-the-counter Motrin to use on a daily basis and that she may use this with Tylenol  Lumbar xray also reviewed- normal except vascular calcifications. And discusssed. Lab eval in ER without significant findings also . 712  Subjective:   Left lower back pain- for 2 weeks pt states- went to ER 9/29- had u/s- no clear etiology found. Labs were without clear cause- BMP/CBC/u/a done. Pain has \"eased off some\". Taking robaxin. 10/10. Now pain is 6/10. Only can lay flat on back. Pain better sitting up straight or standing but worse lying down or turning to left. No f/c. No incontinence. No night sweats. Yue [place] no paresthesia or weakness. No radiation. Pt states she could probably return to work but is afraid this will worsen pain. Pt had added back pain ER visit previous to follow up visit and I wrote letter excusing her until 10/8 reviewed with her  Had rash around neck from alleve but takes motrin without issue.        F/u in one week VV with myself and has ortho on 11/3/20- pt informed. Prior to Admission medications    Medication Sig Start Date End Date Taking? Authorizing Provider   lidocaine (LIDODERM) 5 % Apply 1 patch as directed for 12 hours every 24 hours (12 hours on, 12 hours off) 9/29/20  Yes Provider, Historical   lisinopril-hydroCHLOROthiazide (PRINZIDE, ZESTORETIC) 20-12.5 mg per tablet 1 tablet by mouth daily 9/10/20  Yes Ilan Guzman MD   gabapentin (NEURONTIN) 300 mg capsule Take 1 Cap by mouth daily. Take before bedtime 3/12/20  Yes Ilan Guzman MD   cholecalciferol (VITAMIN D3) (2,000 UNITS /50 MCG) cap capsule Take 2,000 Units by mouth daily. 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 9) E66.01     Patient Active Problem List    Diagnosis Date Noted    Severe obesity (BMI 35.0-39.9) 06/12/2018    Hot flashes due to menopause 04/18/2017    Vitamin D deficiency 05/11/2016    Prediabetes     Fibroadenoma of breast     Elevated serum alkaline phosphatase level     Hypertension 04/09/2014    Status post breast biopsy 02/06/2013    Granular cell tumor 01/02/2013     Current Outpatient Medications   Medication Sig Dispense Refill    lidocaine (LIDODERM) 5 % Apply 1 patch as directed for 12 hours every 24 hours (12 hours on, 12 hours off)      lisinopril-hydroCHLOROthiazide (PRINZIDE, ZESTORETIC) 20-12.5 mg per tablet 1 tablet by mouth daily 90 Tab 1    gabapentin (NEURONTIN) 300 mg capsule Take 1 Cap by mouth daily.  Take before bedtime 90 Cap 1    cholecalciferol (VITAMIN D3) (2,000 UNITS /50 MCG) cap capsule Take 2,000 Units by mouth daily. 80 Cap 3    ergocalciferol (VITAMIN D2) 50,000 unit capsule TAKE ONE CAPSULE BY MOUTH ONCE A WEEK 12 Cap 1     Allergies   Allergen Reactions    Naprosyn [Naproxen] Rash     Past Medical History:   Diagnosis Date    Elevated serum alkaline phosphatase level     Fibroadenoma of breast     left    Hypertension     Prediabetes     Status post breast biopsy 2013    Vitamin D deficiency     Vitamin D deficiency 2016    Vitamin D deficiency      Past Surgical History:   Procedure Laterality Date    HX BREAST BIOPSY       right breast bx and node bx by radiology.  HX BREAST BIOPSY  2013    Rigth breast needle loc bx and Left breast cyst removal x2    HX  SECTION      HX  SECTION      HX PARTIAL HYSTERECTOMY  10/18/2013    polyps     Family History   Problem Relation Age of Onset    Diabetes Father     Hypertension Father     Elevated Lipids Father     Diabetes Mother     Hypertension Mother     Hypertension Sister      Social History     Tobacco Use    Smoking status: Current Some Day Smoker     Types: Cigarettes     Last attempt to quit: 10/1/2013     Years since quittin.0    Smokeless tobacco: Never Used   Substance Use Topics    Alcohol use: Yes     Comment: rare       ROS  Cardiac- no chest pain or palpitations  Pulmonary- no sob or wheezes  GI- no n/v or diarrhea. Objective:   No flowsheet data found.    General: alert, cooperative, no distress   Mental  status: normal mood, behavior, speech, dress, motor activity, and thought processes, able to follow commands   HENT: NCAT   Neck: no visualized mass   Resp: no respiratory distress   Neuro: no gross deficits   Skin: no discoloration or lesions of concern on visible areas   Psychiatric: normal affect, consistent with stated mood, no evidence of hallucinations     Additional exam findings:   Normal heel and toe walking. Normal lower extremity sensation, deep tendon reflexes 2+ bilaterally and equal at quadriceps and Achilles. Gait is slow but stable. Left mid to upper lumbar region tender to palpationno rashesno palpable spasm. We discussed the expected course, resolution and complications of the diagnosis(es) in detail. Medication risks, benefits, costs, interactions, and alternatives were discussed as indicated. I advised her to contact the office if her condition worsens, changes or fails to improve as anticipated. She expressed understanding with the diagnosis(es) and plan. Madison Carrasco Liv, who was evaluated through a patient-initiated, synchronous (real-time) audio-video encounter, and/or her healthcare decision maker, is aware that it is a billable service, with coverage as determined by her insurance carrier. She provided verbal consent to proceed: Yes, and patient identification was verified. It was conducted pursuant to the emergency declaration under the [address] [place] [address] authority and the [employer] [facility] General Act. A caregiver was present when appropriate. Ability to conduct physical exam was limited. I was at home. The patient was at home.       Pelon Main MD No pressure injuries per flowsheets

## 2025-04-28 NOTE — DIETITIAN INITIAL EVALUATION ADULT - NSFNSGIIOFT_GEN_A_CORE
Pt denies nausea, vomiting, diarrhea, endorsed constipation. bowel movement recorded 4/27 per flowsheets

## 2025-04-28 NOTE — PROGRESS NOTE ADULT - ATTENDING COMMENTS
79M with PMH ILD on chronic steroids p/w AHRF.    CT chest largely similar from prior with extensive subpleural reticular changes, basilar honeycombing and overall fibrotic pattern. Also with small areas of GGO in RUL.    Intermittently requiring 1L NC  Pt denies cough/sputum currently. Comfortable supine in bed on ra    Was being worked up for CTD -ILD as had elevated serologies previously tho most recently negative. Was seen by Rheum as well but not dx     Less likely ILD exacerbation. May just have progression of his fibrotic lung disease        - please wean steroids to pred 40 qd   -  cont abx for now  - f/u sputum cx, blood cx, MRSA/MSSA nasal PCR, urine legionella, urine strep ag  - duoneb q6h  -  check ambulatory sat for home o2 qualification  - outpt can consider antifibrotic therapy but has very advanced dz and unclear benefit at this point  - outpt Rheum f/u if he has actual autoimmune disease

## 2025-04-28 NOTE — PROGRESS NOTE ADULT - ASSESSMENT
79M with PMH ILD on chronic steroids but not on home O2 presenting with dyspnea cough.    CT chest largely similar from prior with extensive subpleural reticular changes, basilar honeycombing and overall fibrotic pattern. Also with small areas of GGO in RUL.    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     Plan  - please wean steroids to pred 40 qd  - please repeat procalcitonin, if same or decreasing can stop abx otherwise would rx for total of 5 day course, can switch to PO when ready for discharge  - f/u sputum cx, blood cx, MRSA/MSSA nasal PCR, urine legionella, urine strep ag  - duoneb q6h  - Patient might require home oxygen. Please document RA saturation at rest and with exertion. Please record amount of O2 (L) needed to correct SpO2 to >92. Please document in flow sheets.   - if no need for home O2 and doing well on pred 40 can discharge home with PO abx and plan to taper steroids by 10mg every 5 days    This patient will need to follow up with the pulmonary clinic after discharge:  Please put "Pulmonary HOME Program" in the discharge token and PAS will schedule the followup.  Please discuss the appointment details with the patient and include the appointment details in the patients discharge summary.

## 2025-04-28 NOTE — DIETITIAN INITIAL EVALUATION ADULT - REASON FOR ADMISSION
ILD, pneumonia    per H&P: "79M w/hx of ILD (on home O2), HTN, HLD, seizures, dementia presents due to worsening cough and decreased appetite for several days."

## 2025-04-28 NOTE — PROGRESS NOTE ADULT - SUBJECTIVE AND OBJECTIVE BOX
PULMONARY PROGRESS NOTE    PATIENT INFORMATION:  NAME: CHICHO CONTEH:  MRN: [id number]    CHIEF COMPLAINT: Patient is a 79y old  Male who presents with a chief complaint of ILD, pneumonia (28 Apr 2025 15:38)      [x] INITIAL CONSULT, H&P, FAMILY HISTORY and PAST MEDICAL AND SURGICAL HISTORY REVIEWED    OVERNIGHT EVENTS, CHANGES TO HPI, SUBJECTIVE:   - Patient seen and examined at bedside  - No overnight events  - Symptoms stable/improving    ========================REVIEW OF SYSTEMS========================  [x] ROS negative except as per HPI    ========================MEDICATIONS=============================  NEURO  donepezil 10 milliGRAM(s) Oral at bedtime    CARDIO  losartan 25 milliGRAM(s) Oral daily    PULM  albuterol/ipratropium for Nebulization 3 milliLiter(s) Nebulizer every 6 hours  cetirizine 10 milliGRAM(s) Oral daily    FEN/GI    HEME/ONC  aspirin enteric coated 81 milliGRAM(s) Oral daily    ANTIMICROBIALS  azithromycin  IVPB      azithromycin  IVPB 500 milliGRAM(s) IV Intermittent every 24 hours  cefTRIAXone   IVPB 1000 milliGRAM(s) IV Intermittent every 24 hours    ENDO  atorvastatin 20 milliGRAM(s) Oral at bedtime  methylPREDNISolone sodium succinate Injectable 40 milliGRAM(s) IV Push every 12 hours    OTHER      PRN      Drips      ========================PHYSICAL EXAM============================    VITALS: Vital Signs Last 24 Hrs  T(C): 36.2 (28 Apr 2025 20:37), Max: 36.8 (28 Apr 2025 04:51)  T(F): 97.1 (28 Apr 2025 20:37), Max: 98.3 (28 Apr 2025 04:51)  HR: 57 (28 Apr 2025 20:37) (57 - 73)  BP: 122/62 (28 Apr 2025 20:37) (110/60 - 122/62)  BP(mean): --  RR: 18 (28 Apr 2025 20:37) (18 - 18)  SpO2: 96% (28 Apr 2025 20:37) (92% - 96%)    Parameters below as of 28 Apr 2025 20:37  Patient On (Oxygen Delivery Method): nasal cannula  O2 Flow (L/min): 1      INTAKE and OUTPUT: I&O's Detail    27 Apr 2025 07:01  -  28 Apr 2025 07:00  --------------------------------------------------------  IN:    Oral Fluid: 480 mL  Total IN: 480 mL    OUT:  Total OUT: 0 mL    Total NET: 480 mL      28 Apr 2025 07:01  -  28 Apr 2025 21:35  --------------------------------------------------------  IN:    Oral Fluid: 480 mL  Total IN: 480 mL    OUT:  Total OUT: 0 mL    Total NET: 480 mL          VENTILATOR SETTINGS:     Physical Exam  CONST:        NAD, well-developed, awake and alert  ENMT:         MMM, no pharyngeal injection or exudates; no LAD  RESP:           Normal effort and expansion. Bilateral fine crackles.  CHEST:        No tenderness. No cardiac devices, lines, or chest tubes.   CARD:          RRR, normal S1,S2. no MRG.  VASC:          No appreciable JVD; no LE edema  ABD:            Soft, nontender, nondistended  MSK:            +clubbing.  No cyanosis of digits  EXT:             WWP; cap refill <2s; pulses are 2+ B/L  PSYCH:         Mood and affect appropriate  NEURO:       Non-focal; moving all extremities   SKIN:            No visible rashes on exposed skin       ======================LABORATORY RESULTS AND IMAGING=============                        12.1   9.63  )-----------( 223      ( 28 Apr 2025 07:14 )             38.2                                  04-28    139  |  103  |  24[H]  ----------------------------<  157[H]  4.5   |  25  |  0.78    Ca    9.3      28 Apr 2025 07:12  Phos  2.9     04-28  Mg     2.2     04-28    TPro  7.4  /  Alb  3.5  /  TBili  0.6  /  DBili  x   /  AST  21  /  ALT  12  /  AlkPhos  69  04-27      Ref-range: <3 normal, >9 elevated, >18 very elevated    Procalcitonin: 0.14 ng/mL (04-26-25 @ 13:01)        ABG Trend    VBG Trend  04-26-25 @ 15:20 FiO2--  - 7.39/46/71/28/94.5 Lactate 0.8  04-26-25 @ 12:15 FiO2--  - 7.34/58/27/31/40.3 Lactate 2.2  10-05-22 @ 18:39 FiO2--  - 7.35/54/32/30/54.7 Lactate 1.7      Creatinine Trend: 0.78<--, 0.78<--, 0.94<--    [x] RADIOLOGY REVIEWED AND INTERPRETED BY ME

## 2025-04-28 NOTE — PROGRESS NOTE ADULT - PROBLEM SELECTOR PLAN 2
Patient with possible pneumonia/multi-focal pneumonia based on CT/CXR  - Empirically cover with ceftriaxone and azithromycin

## 2025-04-28 NOTE — DIETITIAN INITIAL EVALUATION ADULT - PERTINENT MEDS FT
MEDICATIONS  (STANDING):  aspirin enteric coated 81 milliGRAM(s) Oral daily  atorvastatin 20 milliGRAM(s) Oral at bedtime  azithromycin  IVPB      azithromycin  IVPB 500 milliGRAM(s) IV Intermittent every 24 hours  cefTRIAXone   IVPB 1000 milliGRAM(s) IV Intermittent every 24 hours  cetirizine 10 milliGRAM(s) Oral daily  donepezil 10 milliGRAM(s) Oral at bedtime  losartan 25 milliGRAM(s) Oral daily  methylPREDNISolone sodium succinate Injectable 40 milliGRAM(s) IV Push every 12 hours  primidone 50 milliGRAM(s) Oral daily    MEDICATIONS  (PRN):  acetaminophen     Tablet .. 650 milliGRAM(s) Oral every 6 hours PRN Temp greater or equal to 38C (100.4F), Mild Pain (1 - 3)  albuterol/ipratropium for Nebulization 3 milliLiter(s) Nebulizer every 6 hours PRN Shortness of Breath and/or Wheezing  melatonin 3 milliGRAM(s) Oral at bedtime PRN Insomnia

## 2025-04-29 ENCOUNTER — TRANSCRIPTION ENCOUNTER (OUTPATIENT)
Age: 80
End: 2025-04-29

## 2025-04-29 ENCOUNTER — RESULT REVIEW (OUTPATIENT)
Age: 80
End: 2025-04-29

## 2025-04-29 VITALS — OXYGEN SATURATION: 95 % | RESPIRATION RATE: 18 BRPM

## 2025-04-29 LAB
ANION GAP SERPL CALC-SCNC: 11 MMOL/L — SIGNIFICANT CHANGE UP (ref 5–17)
BUN SERPL-MCNC: 21 MG/DL — SIGNIFICANT CHANGE UP (ref 7–23)
CALCIUM SERPL-MCNC: 8.9 MG/DL — SIGNIFICANT CHANGE UP (ref 8.4–10.5)
CHLORIDE SERPL-SCNC: 104 MMOL/L — SIGNIFICANT CHANGE UP (ref 96–108)
CO2 SERPL-SCNC: 26 MMOL/L — SIGNIFICANT CHANGE UP (ref 22–31)
CREAT SERPL-MCNC: 0.8 MG/DL — SIGNIFICANT CHANGE UP (ref 0.5–1.3)
EGFR: 91 ML/MIN/1.73M2 — SIGNIFICANT CHANGE UP
EGFR: 91 ML/MIN/1.73M2 — SIGNIFICANT CHANGE UP
GLUCOSE SERPL-MCNC: 171 MG/DL — HIGH (ref 70–99)
GRAM STN FLD: ABNORMAL
HCT VFR BLD CALC: 37.7 % — LOW (ref 39–50)
HGB BLD-MCNC: 12.3 G/DL — LOW (ref 13–17)
LEGIONELLA AG UR QL: NEGATIVE — SIGNIFICANT CHANGE UP
MAGNESIUM SERPL-MCNC: 2.1 MG/DL — SIGNIFICANT CHANGE UP (ref 1.6–2.6)
MCHC RBC-ENTMCNC: 30.2 PG — SIGNIFICANT CHANGE UP (ref 27–34)
MCHC RBC-ENTMCNC: 32.6 G/DL — SIGNIFICANT CHANGE UP (ref 32–36)
MCV RBC AUTO: 92.6 FL — SIGNIFICANT CHANGE UP (ref 80–100)
NRBC BLD AUTO-RTO: 0 /100 WBCS — SIGNIFICANT CHANGE UP (ref 0–0)
NT-PROBNP SERPL-SCNC: 1646 PG/ML — HIGH (ref 0–300)
PHOSPHATE SERPL-MCNC: 3 MG/DL — SIGNIFICANT CHANGE UP (ref 2.5–4.5)
PLATELET # BLD AUTO: 225 K/UL — SIGNIFICANT CHANGE UP (ref 150–400)
POTASSIUM SERPL-MCNC: 3.9 MMOL/L — SIGNIFICANT CHANGE UP (ref 3.5–5.3)
POTASSIUM SERPL-SCNC: 3.9 MMOL/L — SIGNIFICANT CHANGE UP (ref 3.5–5.3)
PROCALCITONIN SERPL-MCNC: 0.06 NG/ML — SIGNIFICANT CHANGE UP (ref 0.02–0.1)
RBC # BLD: 4.07 M/UL — LOW (ref 4.2–5.8)
RBC # FLD: 13.9 % — SIGNIFICANT CHANGE UP (ref 10.3–14.5)
S PNEUM AG UR QL: NEGATIVE — SIGNIFICANT CHANGE UP
SODIUM SERPL-SCNC: 141 MMOL/L — SIGNIFICANT CHANGE UP (ref 135–145)
SPECIMEN SOURCE: SIGNIFICANT CHANGE UP
WBC # BLD: 9.39 K/UL — SIGNIFICANT CHANGE UP (ref 3.8–10.5)
WBC # FLD AUTO: 9.39 K/UL — SIGNIFICANT CHANGE UP (ref 3.8–10.5)

## 2025-04-29 PROCEDURE — 87040 BLOOD CULTURE FOR BACTERIA: CPT

## 2025-04-29 PROCEDURE — 80053 COMPREHEN METABOLIC PANEL: CPT

## 2025-04-29 PROCEDURE — 81003 URINALYSIS AUTO W/O SCOPE: CPT

## 2025-04-29 PROCEDURE — 99233 SBSQ HOSP IP/OBS HIGH 50: CPT | Mod: GC

## 2025-04-29 PROCEDURE — 84132 ASSAY OF SERUM POTASSIUM: CPT

## 2025-04-29 PROCEDURE — 85610 PROTHROMBIN TIME: CPT

## 2025-04-29 PROCEDURE — 85027 COMPLETE CBC AUTOMATED: CPT

## 2025-04-29 PROCEDURE — 87641 MR-STAPH DNA AMP PROBE: CPT

## 2025-04-29 PROCEDURE — 80061 LIPID PANEL: CPT

## 2025-04-29 PROCEDURE — 71045 X-RAY EXAM CHEST 1 VIEW: CPT

## 2025-04-29 PROCEDURE — 82803 BLOOD GASES ANY COMBINATION: CPT

## 2025-04-29 PROCEDURE — 83036 HEMOGLOBIN GLYCOSYLATED A1C: CPT

## 2025-04-29 PROCEDURE — 94640 AIRWAY INHALATION TREATMENT: CPT

## 2025-04-29 PROCEDURE — 87640 STAPH A DNA AMP PROBE: CPT

## 2025-04-29 PROCEDURE — 87205 SMEAR GRAM STAIN: CPT

## 2025-04-29 PROCEDURE — 93306 TTE W/DOPPLER COMPLETE: CPT | Mod: 26

## 2025-04-29 PROCEDURE — 83605 ASSAY OF LACTIC ACID: CPT

## 2025-04-29 PROCEDURE — 80048 BASIC METABOLIC PNL TOTAL CA: CPT

## 2025-04-29 PROCEDURE — 96375 TX/PRO/DX INJ NEW DRUG ADDON: CPT

## 2025-04-29 PROCEDURE — 93306 TTE W/DOPPLER COMPLETE: CPT

## 2025-04-29 PROCEDURE — 85730 THROMBOPLASTIN TIME PARTIAL: CPT

## 2025-04-29 PROCEDURE — 84484 ASSAY OF TROPONIN QUANT: CPT

## 2025-04-29 PROCEDURE — 84295 ASSAY OF SERUM SODIUM: CPT

## 2025-04-29 PROCEDURE — 85014 HEMATOCRIT: CPT

## 2025-04-29 PROCEDURE — 85025 COMPLETE CBC W/AUTO DIFF WBC: CPT

## 2025-04-29 PROCEDURE — 84100 ASSAY OF PHOSPHORUS: CPT

## 2025-04-29 PROCEDURE — 71250 CT THORAX DX C-: CPT | Mod: MC

## 2025-04-29 PROCEDURE — 99239 HOSP IP/OBS DSCHRG MGMT >30: CPT

## 2025-04-29 PROCEDURE — 82435 ASSAY OF BLOOD CHLORIDE: CPT

## 2025-04-29 PROCEDURE — 82947 ASSAY GLUCOSE BLOOD QUANT: CPT

## 2025-04-29 PROCEDURE — 87899 AGENT NOS ASSAY W/OPTIC: CPT

## 2025-04-29 PROCEDURE — 93005 ELECTROCARDIOGRAM TRACING: CPT

## 2025-04-29 PROCEDURE — 82330 ASSAY OF CALCIUM: CPT

## 2025-04-29 PROCEDURE — 84145 PROCALCITONIN (PCT): CPT

## 2025-04-29 PROCEDURE — 83735 ASSAY OF MAGNESIUM: CPT

## 2025-04-29 PROCEDURE — 96374 THER/PROPH/DIAG INJ IV PUSH: CPT

## 2025-04-29 PROCEDURE — 99285 EMERGENCY DEPT VISIT HI MDM: CPT

## 2025-04-29 PROCEDURE — 87637 SARSCOV2&INF A&B&RSV AMP PRB: CPT

## 2025-04-29 PROCEDURE — 85018 HEMOGLOBIN: CPT

## 2025-04-29 PROCEDURE — 93308 TTE F-UP OR LMTD: CPT

## 2025-04-29 PROCEDURE — 87086 URINE CULTURE/COLONY COUNT: CPT

## 2025-04-29 PROCEDURE — 87070 CULTURE OTHR SPECIMN AEROBIC: CPT

## 2025-04-29 PROCEDURE — 83880 ASSAY OF NATRIURETIC PEPTIDE: CPT

## 2025-04-29 RX ORDER — ALBUTEROL SULFATE 2.5 MG/3ML
2 VIAL, NEBULIZER (ML) INHALATION
Qty: 1 | Refills: 0
Start: 2025-04-29

## 2025-04-29 RX ORDER — PREDNISONE 20 MG/1
40 TABLET ORAL DAILY
Refills: 0 | Status: DISCONTINUED | OUTPATIENT
Start: 2025-04-29 | End: 2025-04-29

## 2025-04-29 RX ORDER — CEFPODOXIME PROXETIL 200 MG/1
1 TABLET, FILM COATED ORAL
Qty: 4 | Refills: 0
Start: 2025-04-29 | End: 2025-04-30

## 2025-04-29 RX ORDER — PREDNISONE 20 MG/1
4 TABLET ORAL
Qty: 46 | Refills: 0
Start: 2025-04-29

## 2025-04-29 RX ORDER — ACETAMINOPHEN 500 MG/5ML
2 LIQUID (ML) ORAL
Qty: 0 | Refills: 0 | DISCHARGE
Start: 2025-04-29

## 2025-04-29 RX ORDER — MELOXICAM 15 MG/1
1 TABLET ORAL
Refills: 0 | DISCHARGE

## 2025-04-29 RX ADMIN — PREDNISONE 40 MILLIGRAM(S): 20 TABLET ORAL at 09:42

## 2025-04-29 RX ADMIN — Medication 81 MILLIGRAM(S): at 09:41

## 2025-04-29 RX ADMIN — IPRATROPIUM BROMIDE AND ALBUTEROL SULFATE 3 MILLILITER(S): .5; 2.5 SOLUTION RESPIRATORY (INHALATION) at 05:16

## 2025-04-29 RX ADMIN — METHYLPREDNISOLONE ACETATE 40 MILLIGRAM(S): 80 INJECTION, SUSPENSION INTRA-ARTICULAR; INTRALESIONAL; INTRAMUSCULAR; SOFT TISSUE at 05:16

## 2025-04-29 RX ADMIN — Medication 10 MILLIGRAM(S): at 09:42

## 2025-04-29 RX ADMIN — IPRATROPIUM BROMIDE AND ALBUTEROL SULFATE 3 MILLILITER(S): .5; 2.5 SOLUTION RESPIRATORY (INHALATION) at 12:15

## 2025-04-29 RX ADMIN — LOSARTAN POTASSIUM 25 MILLIGRAM(S): 100 TABLET, FILM COATED ORAL at 05:15

## 2025-04-29 RX ADMIN — CEFTRIAXONE 100 MILLIGRAM(S): 500 INJECTION, POWDER, FOR SOLUTION INTRAMUSCULAR; INTRAVENOUS at 05:17

## 2025-04-29 NOTE — DISCHARGE NOTE NURSING/CASE MANAGEMENT/SOCIAL WORK - FINANCIAL ASSISTANCE
[facility] provides services at a reduced cost to those who are determined to be eligible through [facility]’s financial assistance program. Information regarding [facility]’s financial assistance program can be found by going to [email or web address] or by calling [phone].

## 2025-04-29 NOTE — DISCHARGE NOTE PROVIDER - NSDCCPCAREPLAN_GEN_ALL_CORE_FT
PRINCIPAL DISCHARGE DIAGNOSIS  Diagnosis: PNA (pneumonia)  Assessment and Plan of Treatment: Pneumonia is a lung infection that can cause a fever, cough, and trouble breathing.  Continue all antibiotics as ordered until complete.  Nutrition is important, eat small frequent meals.  Get lots of rest and drink fluids.  Call your health care provider upon arrival home from hospital and make a follow up appointment for one week.  If your cough worsens, you develop fever greater than 101', you have shaking chills, a fast heartbeat, trouble breathing and/or feel your are breathing much faster than usual, call your healthcare provider.  Make sure you wash your hands frequently.      SECONDARY DISCHARGE DIAGNOSES  Diagnosis: Acute hypoxic respiratory failure  Assessment and Plan of Treatment: Resolved    Diagnosis: ILD (interstitial lung disease)  Assessment and Plan of Treatment: Continue Prednisone taper     PRINCIPAL DISCHARGE DIAGNOSIS  Diagnosis: Acute hypoxic respiratory failure  Assessment and Plan of Treatment: You came to the hospital with shortness of breath. You received steroids and IV antibiotics and your condition improved. You were weaned off of oxygen and do not need it at home. Please continue to take Cefpodoxime 200mg twice daily. Additionally, you were prescribed steroids starting with 40mg daily that will be tapered down by 10mg every few days. The lung doctors from the hospital will reach out to you to set up a post discharge follow up.

## 2025-04-29 NOTE — DISCHARGE NOTE PROVIDER - CARE PROVIDER_API CALL
Lisker, Gita Naomi  Critical Care Medicine  [address] [place] [address], [facility] [address]  [place] [address]  Phone: [phone]  Fax: [phone]  Follow Up Time: 1-3 days

## 2025-04-29 NOTE — DISCHARGE NOTE PROVIDER - NSDCMRMEDTOKEN_GEN_ALL_CORE_FT
acetaminophen 325 mg oral tablet: 2 tab(s) orally every 6 hours As needed Temp greater or equal to 38C (100.4F), Mild Pain (1 - 3)  aspirin 81 mg oral tablet: 1 tab(s) orally once a day  cetirizine 10 mg oral tablet: 1 tab(s) orally once a day  cholecalciferol 1250 mcg (50,000 intl units) oral capsule: 1 cap(s) orally once a week  donepezil 10 mg oral tablet: 1 tab(s) orally once a day  Lipitor 20 mg oral tablet: 1 tab(s) orally once a day (at bedtime)  losartan 25 mg oral tablet: 1 tab(s) orally once a day   acetaminophen 325 mg oral tablet: 2 tab(s) orally every 6 hours As needed Temp greater or equal to 38C (100.4F), Mild Pain (1 - 3)  Albuterol (Eqv-ProAir HFA) 90 mcg/inh inhalation aerosol: 2 puff(s) inhaled every 6 hours as needed for  shortness of breath and/or wheezing  aspirin 81 mg oral tablet: 1 tab(s) orally once a day  cefpodoxime 200 mg oral tablet: 1 tab(s) orally 2 times a day  cetirizine 10 mg oral tablet: 1 tab(s) orally once a day  cholecalciferol 1250 mcg (50,000 intl units) oral capsule: 1 cap(s) orally once a week  donepezil 10 mg oral tablet: 1 tab(s) orally once a day  Lipitor 20 mg oral tablet: 1 tab(s) orally once a day (at bedtime)  losartan 25 mg oral tablet: 1 tab(s) orally once a day  predniSONE 10 mg oral tablet: 4 tab(s) orally once a day x 4 days, 3 tabs po daily x 5 days, 2 tabs po daily x 5 days, 1 tab po daily x 5 days

## 2025-04-29 NOTE — PROGRESS NOTE ADULT - SUBJECTIVE AND OBJECTIVE BOX
PULMONARY PROGRESS NOTE    PATIENT INFORMATION:  NAME: CHICHO CONTEH:  MRN: [id number]    CHIEF COMPLAINT: Patient is a 79y old  Male who presents with a chief complaint of ILD, pneumonia (28 Apr 2025 21:34)      [x] INITIAL CONSULT, H&P, FAMILY HISTORY and PAST MEDICAL AND SURGICAL HISTORY REVIEWED    OVERNIGHT EVENTS, CHANGES TO HPI, SUBJECTIVE:   - Patient seen and examined at bedside  - No overnight events  - Symptoms stable/improving    ========================REVIEW OF SYSTEMS========================  [x] ROS negative except as per HPI    ========================MEDICATIONS=============================  NEURO  donepezil 10 milliGRAM(s) Oral at bedtime    CARDIO  losartan 25 milliGRAM(s) Oral daily    PULM  albuterol/ipratropium for Nebulization 3 milliLiter(s) Nebulizer every 6 hours  cetirizine 10 milliGRAM(s) Oral daily    FEN/GI    HEME/ONC  aspirin enteric coated 81 milliGRAM(s) Oral daily    ANTIMICROBIALS  azithromycin  IVPB      azithromycin  IVPB 500 milliGRAM(s) IV Intermittent every 24 hours  cefTRIAXone   IVPB 1000 milliGRAM(s) IV Intermittent every 24 hours    ENDO  atorvastatin 20 milliGRAM(s) Oral at bedtime  predniSONE   Tablet 40 milliGRAM(s) Oral daily    OTHER      PRN      Drips      ========================PHYSICAL EXAM============================    VITALS: Vital Signs Last 24 Hrs  T(C): 36.4 (29 Apr 2025 04:55), Max: 36.8 (28 Apr 2025 12:20)  T(F): 97.5 (29 Apr 2025 04:55), Max: 98.2 (28 Apr 2025 12:20)  HR: 58 (29 Apr 2025 04:55) (57 - 73)  BP: 150/62 (29 Apr 2025 04:55) (110/60 - 150/62)  BP(mean): --  RR: 18 (29 Apr 2025 04:55) (18 - 18)  SpO2: 94% (29 Apr 2025 04:55) (92% - 96%)    Parameters below as of 29 Apr 2025 04:55  Patient On (Oxygen Delivery Method): nasal cannula  O2 Flow (L/min): 1      INTAKE and OUTPUT: I&O's Detail    28 Apr 2025 07:01  -  29 Apr 2025 07:00  --------------------------------------------------------  IN:    Oral Fluid: 480 mL  Total IN: 480 mL    OUT:  Total OUT: 0 mL    Total NET: 480 mL          VENTILATOR SETTINGS:     Physical Exam  CONST:       ENMT:         RESP :         CHEST:        CARDS:      VASC:           ABD:            MSK:            NEURO:       SKIN:           ======================LABORATORY RESULTS AND IMAGING=============                        12.3   9.39  )-----------( 225      ( 29 Apr 2025 06:57 )             37.7                                  04-29    141  |  104  |  21  ----------------------------<  171[H]  3.9   |  26  |  0.80    Ca    8.9      29 Apr 2025 06:57  Phos  3.0     04-29  Mg     2.1     04-29        Ref-range: <3 normal, >9 elevated, >18 very elevated    Procalcitonin: 0.14 ng/mL (04-26-25 @ 13:01)        ABG Trend    VBG Trend  04-26-25 @ 15:20 FiO2--  - 7.39/46/71/28/94.5 Lactate 0.8  04-26-25 @ 12:15 FiO2--  - 7.34/58/27/31/40.3 Lactate 2.2  10-05-22 @ 18:39 FiO2--  - 7.35/54/32/30/54.7 Lactate 1.7      Creatinine Trend: 0.80<--, 0.78<--, 0.78<--, 0.94<--    [x] RADIOLOGY REVIEWED AND INTERPRETED BY ME     PULMONARY PROGRESS NOTE    PATIENT INFORMATION:  NAME: CHICHO CONTEH:  MRN: [id number]    CHIEF COMPLAINT: Patient is a 79y old  Male who presents with a chief complaint of ILD, pneumonia (28 Apr 2025 21:34)      [x] INITIAL CONSULT, H&P, FAMILY HISTORY and PAST MEDICAL AND SURGICAL HISTORY REVIEWED    OVERNIGHT EVENTS, CHANGES TO HPI, SUBJECTIVE:   - Patient seen and examined at bedside  - No overnight events  - Symptoms stable/improving    ========================REVIEW OF SYSTEMS========================  [x] ROS negative except as per HPI    ========================MEDICATIONS=============================  NEURO  donepezil 10 milliGRAM(s) Oral at bedtime    CARDIO  losartan 25 milliGRAM(s) Oral daily    PULM  albuterol/ipratropium for Nebulization 3 milliLiter(s) Nebulizer every 6 hours  cetirizine 10 milliGRAM(s) Oral daily    FEN/GI    HEME/ONC  aspirin enteric coated 81 milliGRAM(s) Oral daily    ANTIMICROBIALS  azithromycin  IVPB      azithromycin  IVPB 500 milliGRAM(s) IV Intermittent every 24 hours  cefTRIAXone   IVPB 1000 milliGRAM(s) IV Intermittent every 24 hours    ENDO  atorvastatin 20 milliGRAM(s) Oral at bedtime  predniSONE   Tablet 40 milliGRAM(s) Oral daily    OTHER      PRN      Drips      ========================PHYSICAL EXAM============================    VITALS: Vital Signs Last 24 Hrs  T(C): 36.4 (29 Apr 2025 04:55), Max: 36.8 (28 Apr 2025 12:20)  T(F): 97.5 (29 Apr 2025 04:55), Max: 98.2 (28 Apr 2025 12:20)  HR: 58 (29 Apr 2025 04:55) (57 - 73)  BP: 150/62 (29 Apr 2025 04:55) (110/60 - 150/62)  BP(mean): --  RR: 18 (29 Apr 2025 04:55) (18 - 18)  SpO2: 94% (29 Apr 2025 04:55) (92% - 96%)    Parameters below as of 29 Apr 2025 04:55  Patient On (Oxygen Delivery Method): nasal cannula  O2 Flow (L/min): 1      INTAKE and OUTPUT: I&O's Detail    28 Apr 2025 07:01  -  29 Apr 2025 07:00  --------------------------------------------------------  IN:    Oral Fluid: 480 mL  Total IN: 480 mL    OUT:  Total OUT: 0 mL    Total NET: 480 mL          VENTILATOR SETTINGS:     Physical Exam  CONST:        NAD, well-developed, awake and alert  ENMT:         MMM, no pharyngeal injection or exudates; no LAD  RESP:           Normal effort and expansion. Normal and equal air entry. No WRR.  CHEST:        No tenderness. No cardiac devices, lines, or chest tubes.   CARD:          RRR, normal S1,S2. no MRG.  VASC:          No appreciable JVD; no LE edema  ABD:            Soft, nontender, nondistended  MSK:            No clubbing or cyanosis of digits  EXT:             WWP; cap refill <2s; pulses are 2+ B/L  PSYCH:         Mood and affect appropriate  NEURO:       Non-focal; moving all extremities   SKIN:            No visible rashes on exposed skin       ======================LABORATORY RESULTS AND IMAGING=============                        12.3   9.39  )-----------( 225      ( 29 Apr 2025 06:57 )             37.7                                  04-29    141  |  104  |  21  ----------------------------<  171[H]  3.9   |  26  |  0.80    Ca    8.9      29 Apr 2025 06:57  Phos  3.0     04-29  Mg     2.1     04-29        Ref-range: <3 normal, >9 elevated, >18 very elevated    Procalcitonin: 0.14 ng/mL (04-26-25 @ 13:01)        ABG Trend    VBG Trend  04-26-25 @ 15:20 FiO2--  - 7.39/46/71/28/94.5 Lactate 0.8  04-26-25 @ 12:15 FiO2--  - 7.34/58/27/31/40.3 Lactate 2.2  10-05-22 @ 18:39 FiO2--  - 7.35/54/32/30/54.7 Lactate 1.7      Creatinine Trend: 0.80<--, 0.78<--, 0.78<--, 0.94<--    [x] RADIOLOGY REVIEWED AND INTERPRETED BY ME

## 2025-04-29 NOTE — PROGRESS NOTE ADULT - ATTENDING COMMENTS
79M with PMH ILD on chronic steroids p/w AHRF.    CT chest largely similar from prior with extensive subpleural reticular changes, basilar honeycombing and overall fibrotic pattern. Also with small areas of GGO in RUL.    Intermittently requiring 1L NC  Pt denies cough/sputum currently. Comfortable supine in bed on ra    Was being worked up for CTD -ILD as had elevated serologies previously tho most recently negative. Was seen by Rheum as well but not dx     Less likely ILD exacerbation. May just have progression of his fibrotic lung disease        - please wean steroids to pred 40 qd   -  procal normal, consider d/c abx   - f/u sputum cx, blood cx, MRSA/MSSA nasal PCR, urine legionella, urine strep ag  - duoneb q6h  -  check ambulatory sat for home o2 qualification- did well and sat 95% ra  - outpt can consider antifibrotic therapy but has very advanced dz and unclear benefit at this point  - outpt Rheum f/u to follow if pt actually has autoimmune dz

## 2025-04-29 NOTE — DISCHARGE NOTE PROVIDER - NSDCFUADDAPPT_GEN_ALL_CORE_FT
APPTS ARE READY TO BE MADE: [x ] YES    Best Family or Patient Contact (if needed):    Additional Information about above appointments (if needed):    1:   2:   3:     Other comments or requests:    APPTS ARE READY TO BE MADE: [x] YES    Best Family or Patient Contact (if needed):    Additional Information about above appointments (if needed):    1: Pulmonary home  2:   3:     Other comments or requests:    APPTS ARE READY TO BE MADE: [x] YES    Best Family or Patient Contact (if needed):    Additional Information about above appointments (if needed):    1: Pulmonary home  2: PCP-Resident clinic  3:     Other comments or requests:    APPTS ARE READY TO BE MADE: [x] YES    Best Family or Patient Contact (if needed):    Additional Information about above appointments (if needed):    1: Pulmonary home  2: PCP-Resident clinic  3:     Appointment was scheduled in [place] with DAVID Hopkins 5/1 9:30am    Other comments or requests:    APPTS ARE READY TO BE MADE: [x] YES    Best Family or Patient Contact (if needed):    Additional Information about above appointments (if needed):    1: Pulmonary home  2: PCP-Resident clinic  3:         Other comments or requests:   Appointment was scheduled in [place] with NP Neisha Hopkins 5/1 9:30am    Prior to outreaching the patient, it was visible that the patient has secured a follow up appointment which was not scheduled by our team. Patient was scheduled on 5/13 at 10A at [address] with Dr. Hull

## 2025-04-29 NOTE — DISCHARGE NOTE NURSING/CASE MANAGEMENT/SOCIAL WORK - PATIENT PORTAL LINK FT
You can access the FollowMyHealth Patient Portal offered by [facility] by registering at the following website: [email or web address]. By joining [facility]’s FollowMyHealth portal, you will also be able to view your health information using other applications (apps) compatible with our system.

## 2025-04-29 NOTE — DISCHARGE NOTE PROVIDER - NSCORESITESY/N_GEN_A_CORE_RD
----- Message from Ana Mera MD sent at 4/29/2022  6:05 AM CDT -----  NO elevation in markers of inflammation  Vitamin D is in good range  CMP with essentially normal liver, kidney, electrolytes.  
Patient reviewed results in my [facility] previously, will update with lab recommendations.    
No

## 2025-04-29 NOTE — PROGRESS NOTE ADULT - PROBLEM SELECTOR PLAN 1
Patient with acute on chronic resp failure 2/2 to ILD flare and/or pneumonia  - Will cover with abx and steroids as below  - Titrate oxygen down to baseline as tolerated  - Pulm consulted, f/u recs Patient with acute on chronic resp failure 2/2 to ILD flare and/or pneumonia  - Will cover with abx and steroids as below  - Weaned off oxygen  - Pulm consulted, f/u recs

## 2025-04-29 NOTE — DISCHARGE NOTE PROVIDER - NSFOLLOWUPCLINICS_GEN_ALL_ED_FT
Home Program  Pulmonary  [place]   Phone:   Fax:   Follow Up Time: 1-3 days     Home Program  Pulmonary  [place]   Phone:   Fax:   Follow Up Time: 1-3 days    [facility]  Internal Medicine  [address] [place] [id number]  Phone: [phone]  Fax:   Follow Up Time: 1 week

## 2025-04-29 NOTE — PROGRESS NOTE ADULT - TIME BILLING
review of laboratory data, radiology results, discussion with primary team\patient, and monitoring for potential decompensation. Interventions were performed as documented above
review of laboratory data, radiology results, discussion with primary team\patient, and monitoring for potential decompensation. Interventions were performed as documented above
Review of tests, imaging, labs, documents, medical management, coordination of care and counseling.

## 2025-04-29 NOTE — PROGRESS NOTE ADULT - NSPROGADDITIONALINFOA_GEN_ALL_CORE
Discussed with patient, son, 4 [employer] ACP.
Medically stable for DC home with outpatient follow up. Discussed with patient, son, SU ACP and pulmonary team. Total time spent DC planning 43 minutes.

## 2025-04-29 NOTE — PROGRESS NOTE ADULT - ASSESSMENT
79M with PMH ILD on chronic steroids but not on home O2 presenting with dyspnea cough.    CT chest largely similar from prior with extensive subpleural reticular changes, basilar honeycombing and overall fibrotic pattern. Also with small areas of GGO in RUL.    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   BNP elevated, echo pending    Plan  - please wean steroids to pred 40 qd  - please repeat procalcitonin, if same or decreasing can stop abx otherwise would rx for total of 5 day course, can switch to PO when ready for discharge  - f/u sputum cx, blood cx, MRSA/MSSA nasal PCR, urine legionella, urine strep ag  - duoneb q6h  - diurese to euvolemia, f/u tte  - Patient might require home oxygen. Please document RA saturation at rest and with exertion. Please record amount of O2 (L) needed to correct SpO2 to >92. Please document in flow sheets.   - if no need for home O2 and doing well on pred 40 can discharge home with PO abx and plan to taper steroids by 10mg every 5 days    This patient will need to follow up with the pulmonary clinic after discharge:  Please put "Pulmonary HOME Program" in the discharge token and PAS will schedule the followup.  Please discuss the appointment details with the patient and include the appointment details in the patients discharge summary.     NOTE INCOMPLETE 79M with PMH ILD on chronic steroids but not on home O2 presenting with dyspnea cough.    CT chest largely similar from prior with extensive subpleural reticular changes, basilar honeycombing and overall fibrotic pattern. Also with small areas of GGO in RUL.    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   BNP elevated, echo pending    Plan  - please wean steroids to pred 40 qd  - can stop abx  - duoneb prn  - diurese to euvolemia, f/u tte  - no need for home O2  - no contraindication to d/c    This patient will need to follow up with the pulmonary clinic after discharge:  Please put "Pulmonary HOME Program" in the discharge token and PAS will schedule the followup.  Please discuss the appointment details with the patient and include the appointment details in the patients discharge summary.

## 2025-04-29 NOTE — PROGRESS NOTE ADULT - PROBLEM SELECTOR PLAN 2
Patient with possible pneumonia/multi-focal pneumonia based on CT/CXR  - Empirically cover with ceftriaxone and azithromycin Patient with possible pneumonia/multi-focal pneumonia based on CT/CXR  - Empirically cover with ceftriaxone

## 2025-04-29 NOTE — DISCHARGE NOTE PROVIDER - NSDCFUSCHEDAPPT_GEN_ALL_CORE_FT
[facility]  OPHTHALM [address]  Scheduled Appointment: 07/08/2025     [facility]  CATSCAN [address]  Scheduled Appointment: 05/27/2025    [facility]  [facility]  OPHTHALM [address]  Scheduled Appointment: 07/08/2025    Keshawn Hull  [facility]  PULMMED [address]  Scheduled Appointment: 08/07/2025

## 2025-04-29 NOTE — DISCHARGE NOTE PROVIDER - HOSPITAL COURSE
HPI:  Patient is a poor historian. Unable to reach son, GAMAL Edge despite 2 phone calls. Additional hx obtained via chart review.    79M w/hx of ILD (on home O2), HTN, HLD, seizures, dementia presents due to worsening cough and decreased appetite for several days. Patient reports feeling better now. Patient does not demonstrate good understanding of underlying ILD. Reports he has been to many hospitals for similar respiratory issues. Reports he would like to be discharged tomorrow. Reports improvement of breathing. denies sick contacts. Patient denies fever, chills, chest pain, headache, dizziness, abd pain, nausea, vomiting. (26 Apr 2025 23:14)    Hospital Course:  presenting with dyspnea cough  CT chest largely similar from prior with extensive subpleural reticular changes, basilar honeycombing and overall fibrotic pattern. Also with small areas of GGO in RUL.  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   BNP elevated, echo normal    - s/p IV solumedrol  - repeat procalcitonin normal  - duoneb q6h  - changed to pred 40 can discharge home with PO abx and plan to taper steroids by 10mg every 5 days      Important Medication Changes and Reason:    Active or Pending Issues Requiring Follow-up:    Advanced Directives:   [ x] Full code  [ ] DNR  [ ] Hospice    Discharge Diagnoses:  AHRF  PNA  ILD         HPI:  Patient is a poor historian. Unable to reach son, GAMAL Edge despite 2 phone calls. Additional hx obtained via chart review.    79M w/hx of ILD (on home O2), HTN, HLD, seizures, dementia presents due to worsening cough and decreased appetite for several days. Patient reports feeling better now. Patient does not demonstrate good understanding of underlying ILD. Reports he has been to many hospitals for similar respiratory issues. Reports he would like to be discharged tomorrow. Reports improvement of breathing. denies sick contacts. Patient denies fever, chills, chest pain, headache, dizziness, abd pain, nausea, vomiting. (26 Apr 2025 23:14)    Hospital Course:  presenting with dyspnea cough  CT chest largely similar from prior with extensive subpleural reticular changes, basilar honeycombing and overall fibrotic pattern. Also with small areas of GGO in RUL.  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   BNP elevated, echo normal    - s/p IV solumedrol  - s/p Ceftriaxone   - repeat procalcitonin normal  - duoneb q6h  - changed to pred 40 can discharge home with PO abx Cefpoxime and plan to taper steroids by 10mg every 5 days      Important Medication Changes and Reason:    Active or Pending Issues Requiring Follow-up:    Advanced Directives:   [ x] Full code  [ ] DNR  [ ] Hospice    Discharge Diagnoses:  AHRF  PNA  ILD         HPI:  Patient is a poor historian. Unable to reach son, GAMAL Edge despite 2 phone calls. Additional hx obtained via chart review.    79M w/hx of ILD (on home O2), HTN, HLD, seizures, dementia presents due to worsening cough and decreased appetite for several days. Patient reports feeling better now. Patient does not demonstrate good understanding of underlying ILD. Reports he has been to many hospitals for similar respiratory issues. Reports he would like to be discharged tomorrow. Reports improvement of breathing. denies sick contacts. Patient denies fever, chills, chest pain, headache, dizziness, abd pain, nausea, vomiting. (26 Apr 2025 23:14)    Hospital Course:  presenting with dyspnea cough  CT chest largely similar from prior with extensive subpleural reticular changes, basilar honeycombing and overall fibrotic pattern. Also with small areas of GGO in RUL.  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   BNP elevated, echo normal    - s/p IV solumedrol  - s/p Ceftriaxone   - repeat procalcitonin normal  - duoneb q6h  - changed to pred 40 can discharge home with PO abx Cefpoxime and plan to taper steroids by 10mg every 5 days      Important Medication Changes and Reason:    Active or Pending Issues Requiring Follow-up:    Advanced Directives:   [ x] Full code  [ ] DNR  [ ] Hospice    Discharge Diagnoses:  AHRF  PNA  ILD  HTN  HLD

## 2025-04-29 NOTE — DISCHARGE NOTE NURSING/CASE MANAGEMENT/SOCIAL WORK - NSDCFUADDAPPT_GEN_ALL_CORE_FT
APPTS ARE READY TO BE MADE: [x] YES    Best Family or Patient Contact (if needed):    Additional Information about above appointments (if needed):    1: Pulmonary home  2: PCP-Resident clinic  3:     Other comments or requests:

## 2025-04-29 NOTE — PROGRESS NOTE ADULT - SUBJECTIVE AND OBJECTIVE BOX
[facility] Division of Hospital Medicine  Idris Bates DO  Reachable on [employer] Teams    Patient is a 79y old  Male who presents with a chief complaint of ILD, pneumonia (29 Apr 2025 11:49)    SUBJECTIVE / OVERNIGHT EVENTS: No acute events overnight. Patient seen and examined at bedside this morning, endorses cough    ADDITIONAL REVIEW OF SYSTEMS:    MEDICATIONS  (STANDING):  albuterol/ipratropium for Nebulization 3 milliLiter(s) Nebulizer every 6 hours  aspirin enteric coated 81 milliGRAM(s) Oral daily  atorvastatin 20 milliGRAM(s) Oral at bedtime  cefTRIAXone   IVPB 1000 milliGRAM(s) IV Intermittent every 24 hours  cetirizine 10 milliGRAM(s) Oral daily  donepezil 10 milliGRAM(s) Oral at bedtime  losartan 25 milliGRAM(s) Oral daily  predniSONE   Tablet 40 milliGRAM(s) Oral daily    MEDICATIONS  (PRN):  acetaminophen     Tablet .. 650 milliGRAM(s) Oral every 6 hours PRN Temp greater or equal to 38C (100.4F), Mild Pain (1 - 3)  melatonin 3 milliGRAM(s) Oral at bedtime PRN Insomnia      CAPILLARY BLOOD GLUCOSE        I&O's Summary    28 Apr 2025 07:01  -  29 Apr 2025 07:00  --------------------------------------------------------  IN: 480 mL / OUT: 0 mL / NET: 480 mL        PHYSICAL EXAM:  Vital Signs Last 24 Hrs  T(C): 36.4 (29 Apr 2025 11:58), Max: 36.4 (29 Apr 2025 04:55)  T(F): 97.5 (29 Apr 2025 11:58), Max: 97.5 (29 Apr 2025 04:55)  HR: 70 (29 Apr 2025 11:58) (57 - 70)  BP: 146/74 (29 Apr 2025 11:58) (122/62 - 150/62)  BP(mean): --  RR: 18 (29 Apr 2025 12:30) (18 - 18)  SpO2: 95% (29 Apr 2025 12:30) (93% - 96%)    Parameters below as of 29 Apr 2025 12:30  Patient On (Oxygen Delivery Method): room air      CONSTITUTIONAL: NAD, well-developed, well-groomed  EYES: PERRLA; conjunctiva and sclera clear  ENMT: Moist oral mucosa, no pharyngeal injection or exudates; normal dentition  NECK: Supple, no palpable masses; no thyromegaly  RESPIRATORY: Normal respiratory effort; lungs are clear to auscultation bilaterally  CARDIOVASCULAR: Regular rate and rhythm, normal S1 and S2, no murmur/rub/gallop; No lower extremity edema; Peripheral pulses are 2+ bilaterally  ABDOMEN: Nontender to palpation, normoactive bowel sounds, no rebound/guarding; No hepatosplenomegaly  MUSCULOSKELETAL:  Normal gait; no clubbing or cyanosis of digits; no joint swelling or tenderness to palpation  PSYCH: A+O to person, place, and time; affect appropriate  NEUROLOGY: CN 2-12 are intact and symmetric; no gross sensory deficits   SKIN: No rashes; no palpable lesions    LABS:                        12.3   9.39  )-----------( 225      ( 29 Apr 2025 06:57 )             37.7     04-29    141  |  104  |  21  ----------------------------<  171[H]  3.9   |  26  |  0.80    Ca    8.9      29 Apr 2025 06:57  Phos  3.0     04-29  Mg     2.1     04-29            Urinalysis Basic - ( 29 Apr 2025 06:57 )    Color: x / Appearance: x / SG: x / pH: x  Gluc: 171 mg/dL / Ketone: x  / Bili: x / Urobili: x   Blood: x / Protein: x / Nitrite: x   Leuk Esterase: x / RBC: x / WBC x   Sq Epi: x / Non Sq Epi: x / Bacteria: x        Culture - Sputum (collected 28 Apr 2025 17:52)  Source: Sputum Sputum  Gram Stain (29 Apr 2025 05:54):    Moderate polymorphonuclear leukocytes per low power field    Rare Squamous epithelial cells per low power field    Rare Gram positive cocci in pairs seen per oil power field  Preliminary Report (29 Apr 2025 15:34):    Commensal karla consistent with body site    Culture - Urine (collected 26 Apr 2025 17:53)  Source: Clean Catch Clean Catch (Midstream)  Final Report (27 Apr 2025 15:35):    <10,000 CFU/mL Normal Urogenital Karla        RADIOLOGY & ADDITIONAL TESTS:  Results Reviewed:   Imaging Personally Reviewed:  Electrocardiogram Personally Reviewed:    COORDINATION OF CARE:  Care Discussed with Consultants/Other Providers [Y/N]:  Prior or Outpatient Records Reviewed [Y/N]:   [facility] Division of Hospital Medicine  Idris Bates DO  Reachable on [employer] Teams    Patient is a 79y old  Male who presents with a chief complaint of ILD, pneumonia (29 Apr 2025 11:49)    SUBJECTIVE / OVERNIGHT EVENTS: No acute events overnight. Patient seen and examined at bedside this morning, endorses cough. Discussed lefty  Fabio [id number].    REVIEW OF SYSTEMS:    CONSTITUTIONAL: No weakness, fevers or chills  EYES/ENT: No visual changes;  No vertigo or throat pain   NECK: No pain or stiffness  RESPIRATORY: Endorses cough  CARDIOVASCULAR: No chest pain or palpitations  GASTROINTESTINAL: No abdominal or epigastric pain. No nausea, vomiting, or hematemesis; No diarrhea or constipation. No melena or hematochezia.  GENITOURINARY: No dysuria, frequency or hematuria  NEUROLOGICAL: No numbness or weakness  SKIN: No itching, burning, rashes, or lesions  MSK: No joint pain, no back pain  HEME: No easy bleeding, no easy bruising  All other review of systems is negative unless indicated above.    MEDICATIONS  (STANDING):  albuterol/ipratropium for Nebulization 3 milliLiter(s) Nebulizer every 6 hours  aspirin enteric coated 81 milliGRAM(s) Oral daily  atorvastatin 20 milliGRAM(s) Oral at bedtime  cefTRIAXone   IVPB 1000 milliGRAM(s) IV Intermittent every 24 hours  cetirizine 10 milliGRAM(s) Oral daily  donepezil 10 milliGRAM(s) Oral at bedtime  losartan 25 milliGRAM(s) Oral daily  predniSONE   Tablet 40 milliGRAM(s) Oral daily    MEDICATIONS  (PRN):  acetaminophen     Tablet .. 650 milliGRAM(s) Oral every 6 hours PRN Temp greater or equal to 38C (100.4F), Mild Pain (1 - 3)  melatonin 3 milliGRAM(s) Oral at bedtime PRN Insomnia      CAPILLARY BLOOD GLUCOSE        I&O's Summary    28 Apr 2025 07:01  -  29 Apr 2025 07:00  --------------------------------------------------------  IN: 480 mL / OUT: 0 mL / NET: 480 mL        PHYSICAL EXAM:  Vital Signs Last 24 Hrs  T(C): 36.4 (29 Apr 2025 11:58), Max: 36.4 (29 Apr 2025 04:55)  T(F): 97.5 (29 Apr 2025 11:58), Max: 97.5 (29 Apr 2025 04:55)  HR: 70 (29 Apr 2025 11:58) (57 - 70)  BP: 146/74 (29 Apr 2025 11:58) (122/62 - 150/62)  BP(mean): --  RR: 18 (29 Apr 2025 12:30) (18 - 18)  SpO2: 95% (29 Apr 2025 12:30) (93% - 96%)    Parameters below as of 29 Apr 2025 12:30  Patient On (Oxygen Delivery Method): room air    CONSTITUTIONAL: NAD, well-developed, well-groomed  RESPIRATORY: Normal respiratory effort; bilateral rales  CARDIOVASCULAR: Regular rate and rhythm, normal S1 and S2, no murmur/rub/gallop  ABDOMEN: Nontender to palpation, nondistended soft  PSYCH: A+O to person, place, and time; affect appropriate    LABS:                        12.3   9.39  )-----------( 225      ( 29 Apr 2025 06:57 )             37.7     04-29    141  |  104  |  21  ----------------------------<  171[H]  3.9   |  26  |  0.80    Ca    8.9      29 Apr 2025 06:57  Phos  3.0     04-29  Mg     2.1     04-29            Urinalysis Basic - ( 29 Apr 2025 06:57 )    Color: x / Appearance: x / SG: x / pH: x  Gluc: 171 mg/dL / Ketone: x  / Bili: x / Urobili: x   Blood: x / Protein: x / Nitrite: x   Leuk Esterase: x / RBC: x / WBC x   Sq Epi: x / Non Sq Epi: x / Bacteria: x        Culture - Sputum (collected 28 Apr 2025 17:52)  Source: Sputum Sputum  Gram Stain (29 Apr 2025 05:54):    Moderate polymorphonuclear leukocytes per low power field    Rare Squamous epithelial cells per low power field    Rare Gram positive cocci in pairs seen per oil power field  Preliminary Report (29 Apr 2025 15:34):    Commensal chelsi consistent with body site    Culture - Urine (collected 26 Apr 2025 17:53)  Source: Clean Catch Clean Catch (Midstream)  Final Report (27 Apr 2025 15:35):    <10,000 CFU/mL Normal Urogenital Chelsi

## 2025-04-30 LAB
CULTURE RESULTS: ABNORMAL
SPECIMEN SOURCE: SIGNIFICANT CHANGE UP

## 2025-05-01 ENCOUNTER — APPOINTMENT (OUTPATIENT)
Dept: PULMONOLOGY | Facility: CLINIC | Age: 80
End: 2025-05-01
Payer: COMMERCIAL

## 2025-05-01 DIAGNOSIS — J96.01 ACUTE RESPIRATORY FAILURE WITH HYPOXIA: ICD-10-CM

## 2025-05-01 DIAGNOSIS — J84.9 INTERSTITIAL PULMONARY DISEASE, UNSPECIFIED: ICD-10-CM

## 2025-05-01 DIAGNOSIS — I10 ESSENTIAL (PRIMARY) HYPERTENSION: ICD-10-CM

## 2025-05-01 DIAGNOSIS — J18.9 PNEUMONIA, UNSPECIFIED ORGANISM: ICD-10-CM

## 2025-05-01 DIAGNOSIS — E78.5 HYPERLIPIDEMIA, UNSPECIFIED: ICD-10-CM

## 2025-05-01 PROCEDURE — 99496 TRANSJ CARE MGMT HIGH F2F 7D: CPT | Mod: 95

## 2025-05-01 RX ORDER — CETIRIZINE HYDROCHLORIDE 10 MG/1
10 TABLET, COATED ORAL
Refills: 0 | Status: ACTIVE | COMMUNITY

## 2025-05-01 RX ORDER — ALBUTEROL 90 MCG
90 AEROSOL (GRAM) INHALATION
Refills: 0 | Status: ACTIVE | COMMUNITY

## 2025-05-01 RX ORDER — ACETAMINOPHEN 325 MG/1
325 TABLET ORAL
Refills: 0 | Status: ACTIVE | COMMUNITY

## 2025-05-01 RX ORDER — CEFPODOXIME PROXETIL 200 MG/1
200 TABLET, FILM COATED ORAL
Refills: 0 | Status: ACTIVE | COMMUNITY

## 2025-05-09 ENCOUNTER — NON-APPOINTMENT (OUTPATIENT)
Age: 80
End: 2025-05-09

## 2025-05-13 ENCOUNTER — APPOINTMENT (OUTPATIENT)
Dept: PULMONOLOGY | Facility: CLINIC | Age: 80
End: 2025-05-13
Payer: COMMERCIAL

## 2025-05-13 VITALS
HEIGHT: 64 IN | SYSTOLIC BLOOD PRESSURE: 143 MMHG | OXYGEN SATURATION: 92 % | TEMPERATURE: 98.3 F | DIASTOLIC BLOOD PRESSURE: 81 MMHG | HEART RATE: 101 BPM | WEIGHT: 128 LBS | BODY MASS INDEX: 21.85 KG/M2

## 2025-05-13 DIAGNOSIS — J84.9 INTERSTITIAL PULMONARY DISEASE, UNSPECIFIED: ICD-10-CM

## 2025-05-13 PROCEDURE — 99214 OFFICE O/P EST MOD 30 MIN: CPT

## 2025-05-27 ENCOUNTER — APPOINTMENT (OUTPATIENT)
Dept: CT IMAGING | Facility: IMAGING CENTER | Age: 80
End: 2025-05-27

## 2025-06-05 ENCOUNTER — NON-APPOINTMENT (OUTPATIENT)
Age: 80
End: 2025-06-05

## 2025-07-08 ENCOUNTER — NON-APPOINTMENT (OUTPATIENT)
Age: 80
End: 2025-07-08

## 2025-07-08 ENCOUNTER — APPOINTMENT (OUTPATIENT)
Dept: OPHTHALMOLOGY | Facility: CLINIC | Age: 80
End: 2025-07-08

## 2025-07-08 PROCEDURE — 92133 CPTRZD OPH DX IMG PST SGM ON: CPT

## 2025-07-08 PROCEDURE — 92004 COMPRE OPH EXAM NEW PT 1/>: CPT

## 2025-08-14 ENCOUNTER — APPOINTMENT (OUTPATIENT)
Dept: PULMONOLOGY | Facility: CLINIC | Age: 80
End: 2025-08-14
Payer: COMMERCIAL

## 2025-08-14 VITALS
OXYGEN SATURATION: 91 % | HEART RATE: 72 BPM | HEIGHT: 64 IN | WEIGHT: 144 LBS | RESPIRATION RATE: 16 BRPM | BODY MASS INDEX: 24.59 KG/M2 | DIASTOLIC BLOOD PRESSURE: 88 MMHG | SYSTOLIC BLOOD PRESSURE: 142 MMHG

## 2025-08-14 DIAGNOSIS — J84.9 INTERSTITIAL PULMONARY DISEASE, UNSPECIFIED: ICD-10-CM

## 2025-08-14 PROCEDURE — 99213 OFFICE O/P EST LOW 20 MIN: CPT
